# Patient Record
Sex: FEMALE | Race: WHITE | Employment: OTHER | URBAN - METROPOLITAN AREA
[De-identification: names, ages, dates, MRNs, and addresses within clinical notes are randomized per-mention and may not be internally consistent; named-entity substitution may affect disease eponyms.]

---

## 2025-02-28 LAB
NON-UH HIE ALANINE AMINOTRANSFERASE:CCNC:PT:SER/PLAS:QN:NO ADDITION OF P-5': 16 INT._UNIT/L (ref 6–46)
NON-UH HIE ALBUMIN/GLOBULIN:MCRTO:PT:SER:QN:: 1.7 (ref 1.1–2.2)
NON-UH HIE ALBUMIN:MCNC:PT:SER/PLAS:QN:: 4.8 GM/DL (ref 3.3–5)
NON-UH HIE ALKALINE PHOSPHATASE:CCNC:PT:SER/PLAS:QN:: 130 INT._UNIT/L (ref 21–98)
NON-UH HIE ANION GAP:SCNC:PT:SER/PLAS:QN:: 12 MEQ/L (ref 6–16)
NON-UH HIE ASPARTATE AMINOTRANSFERASE:CCNC:PT:SER/PLAS:QN:: 17 INT._UNIT/L (ref 5–43)
NON-UH HIE BACTERIA:PRTHR:PT:URINE:ORD:AUTOMATED: ABNORMAL /HPF
NON-UH HIE BASOPHILS/LEUKOCYTES:NFR.DF:PT:BLD:QN:AUTOMATED COUNT: 0 E9/L (ref 0–0.2)
NON-UH HIE BASOPHILS:NCNC:PT:BLD:QN:AUTOMATED COUNT: 0.4 % (ref 0–2)
NON-UH HIE BILIRUBIN.GLUCURONIDATED+BILIRUBIN.ALBUMIN BOUND:MCNC:PT:SER/PLA: 0 MG/DL (ref 0–0.4)
NON-UH HIE BILIRUBIN.NON-GLUCURONIDATED:MSCNC:PT:SER/PLAS:QN:: 0.3 MG/DL (ref 0.1–0.9)
NON-UH HIE BILIRUBIN:MCNC:PT:SER/PLAS:QN:: 0.3 MG/DL (ref 0–1.1)
NON-UH HIE BILIRUBIN:PRTHR:PT:URINE:ORD:TEST STRIP.AUTOMATED: NEGATIVE MG/DL
NON-UH HIE CALCIUM:MCNC:PT:SER/PLAS:QN:: 9.7 MG/DL (ref 8.9–11.1)
NON-UH HIE CARBON DIOXIDE:SCNC:PT:SER/PLAS:QN:: 26 MMOL/L (ref 21–31)
NON-UH HIE CHLORIDE:SCNC:PT:SER/PLAS:QN:: 105 MMOL/L (ref 101–111)
NON-UH HIE CLARITY:TYPE:PT:URINE:NOM:: ABNORMAL
NON-UH HIE CLASS:TYPE:PT:URINE COLLECTION METHOD:NOM:*: ABNORMAL
NON-UH HIE COLOR:TYPE:PT:URINE:NOM:AUTO: ABNORMAL
NON-UH HIE CREATININE:MCNC:PT:SER/PLAS:QN:: 0.8 MG/DL (ref 0.5–1.3)
NON-UH HIE EGFR: 86 ML/MIN/1.73 M2
NON-UH HIE EOSINOPHILS/100 LEUKOCYTES:NFR:PT:BLD:QN:AUTOMATED COUNT: 0.9 % (ref 0–8)
NON-UH HIE EOSINOPHILS:NCNC:PT:BLD:QN:: 0.1 E9/L (ref 0–0.5)
NON-UH HIE EPITHELIAL CELLS.SQUAMOUS:NARIC:PT:URINE SED:QN:AUTOMATED COUNT: ABNORMAL CD:4673145863
NON-UH HIE ERYTHROCYTE DISTRIBUTION WIDTH:RATIO:PT:RBC:QN:AUTOMATED COUNT: 13.1 % (ref 10.9–14.2)
NON-UH HIE ERYTHROCYTE MEAN CORPUSCULAR HEMOGLOBIN CONCENTRATION:MCNC:PT:RB: 33.6 GM/DL (ref 31.4–36)
NON-UH HIE ERYTHROCYTE MEAN CORPUSCULAR HEMOGLOBIN:ENTMASS:PT:RBC:QN:AUTOMA: 31 PG (ref 27–34)
NON-UH HIE ERYTHROCYTE MEAN CORPUSCULAR VOLUME:ENTVOL:PT:RBC:QN:AUTOMATED C: 92.1 FL (ref 80–100)
NON-UH HIE ERYTHROCYTES:NCNC:PT:BLD:QN:AUTOMATED COUNT: 4.6 E12/L (ref 4.3–5.9)
NON-UH HIE GLOBULIN:MCNC:PT:SER:QN:CALCULATED: 2.9 GM/DL (ref 1.4–4)
NON-UH HIE GLUCOSE:MCNC:PT:SER/PLAS:QN:: 102 MG/DL (ref 55–199)
NON-UH HIE GLUCOSE:PRTHR:PT:URINE:ORD:TEST STRIP: NEGATIVE MG/DL
NON-UH HIE HEMATOCRIT:VFR:PT:BLD:QN:AUTOMATED COUNT: 42.5 % (ref 34–46)
NON-UH HIE HEMOGLOBIN:MCNC:PT:BLD:QN:: 14.3 GM/DL (ref 12–16)
NON-UH HIE HEMOGLOBIN:MCNC:PT:URINE:SEMIQN:TEST STRIP.AUTOMATED: NEGATIVE MG/DL
NON-UH HIE KETONES:PRTHR:PT:URINE:ORD:TEST STRIP.AUTOMATED: ABNORMAL MG/DL
NON-UH HIE LEUKOCYTE ESTERASE:PRTHR:PT:URINE:ORD:TEST STRIP.AUTOMATED: NEGATIVE CD:4673125267
NON-UH HIE LEUKOCYTES: 6.9 E9/L (ref 4–11)
NON-UH HIE LYMPHOCYTES:NCNC:PT:BLD:QN:: 1 E9/L (ref 1–4)
NON-UH HIE LYMPHOCYTES:NCNC:PT:BLD:QN:AUTOMATED COUNT: 14.3 % (ref 14–50)
NON-UH HIE MONOCYTES:NCNC:PT:BLD:QN:AUTOMATED COUNT: 0.7 E9/L (ref 0.2–1)
NON-UH HIE MUCUS:PRTHR:PT:URINE:ORD:AUTOMATED: ABNORMAL CD:4673145661
NON-UH HIE NEUTROPHILS/100 LEUKOCYTES:NFR:PT:BLD:QN:: 74.9 % (ref 36–75)
NON-UH HIE NEUTROPHILS:NCNC:PT:BLD:QN:AUTOMATED COUNT: 5.2 E9/L (ref 2–7.5)
NON-UH HIE NITRITE:PRTHR:PT:URINE:ORD:TEST STRIP.AUTOMATED: NEGATIVE MG/DL
NON-UH HIE PH:LSCNC:PT:URINE:QN:TEST STRIP: 6 (ref 5–9)
NON-UH HIE PLATELET MEAN VOLUME:ENTVOL:PT:BLD:QN:AUTOMATED COUNT: 7.7 FL (ref 6.4–10.8)
NON-UH HIE PLATELETS:NCNC:PT:BLD:QN:AUTOMATED COUNT: 192 E9/L (ref 150–500)
NON-UH HIE POTASSIUM:SCNC:PT:SER/PLAS:QN:: 3.6 MMOL/L (ref 3.5–5.3)
NON-UH HIE PROTEIN:MCNC:PT:SER/PLAS:QN:: 7.7 GM/DL (ref 6–7.8)
NON-UH HIE PROTEIN:PRTHR:PT:URINE:ORD:TEST STRIP: ABNORMAL MG/DL
NON-UH HIE SODIUM:SCNC:PT:SER/PLAS:QN:: 139 MMOL/L (ref 135–145)
NON-UH HIE SPECIFIC GRAVITY:RDEN:PT:URINE:QN:TEST STRIP: 1.03 (ref 1–1.03)
NON-UH HIE TRIACYLGLYCEROL LIPASE:CCNC:PT:SER/PLAS:QN:: 40 UNIT/L (ref 13–58)
NON-UH HIE TUBE COLLECTED PLASMA: YES
NON-UH HIE UREA NITROGEN/CREATININE:MRTO:PT:SER/PLAS:QN:: 16 NO UNITS (ref 10–20)
NON-UH HIE UREA NITROGEN:MCNC:PT:SER/PLAS:QN:: 13 MG/DL (ref 5–21)
NON-UH HIE UROBILINOGEN:MCNC:PT:URINE:SEMIQN:TEST STRIP: ABNORMAL MG/DL

## 2025-03-15 NOTE — H&P
psh:         left ankle 2022         left shoulder 2024         r shoulder 2024         gastric bypass 2002         gastric bypass 2010         gallbladder 2002         common bile duct         gastric fistula 2024         hysterectomy 2012           sh: negative tobacco         occasional etoh         substance use negative           pmh:         syncope         migraine         low bp         bipolar         anxiety         ulcers stomach           allergies:         Nkda           meds:         gabapentin 600mg 2 tab am, 1.5 tabs lunch, 2/5tabs hs         topamax 100mg hs         trazodone 400mg hs         lorazepam 1mg tid         vit e 180mg qd         vit k am         muv         olanzapine 5mg hs         zolpidem 10mg hs         nurtec 75mg prn migraine         magnesium 500mg am         ropinirole 0.mg tid         midodrine 5mg tid         cymbalta 60mg am         nexium 40mg bid         buspar 15mg bid         fanapt 12mg qd           ros: neg other than above           Risks, benefits and alternatives to surgery were discussed:         Risks including bleeding, infection, temporary or permanent numbness, tingling, weakness, paralysis, bowel or bladder dysfunction, leaking of cerebrospinal fluid leading to meningitis, no pain improvement or worsening of pain, future staged surgery, seizure, stroke, blindness, heart attack, blood clot in legs, pulmonary embolism, coma, death, or other. Risk of recurrent or residual symptoms and scar tissue formation which cannot be improved by further surgery.         [Spinal hardware and instrumentation risks include: hardware failure or breaking of screws or rods or plates requiring revision or removal.         Nonfusion or pseudoarthrosis risk is higher with diabetes or tobacco use and may cause long term pain and require future revision, removal or other surgery.]         [Staged surgery a possibility including spinal cord stimulation for continuing pain in the back or

## 2025-03-19 ENCOUNTER — ANESTHESIA EVENT (OUTPATIENT)
Dept: OPERATING ROOM | Age: 55
End: 2025-03-19
Payer: MEDICARE

## 2025-03-20 ENCOUNTER — HOSPITAL ENCOUNTER (INPATIENT)
Age: 55
LOS: 2 days | Discharge: HOME | DRG: 402 | End: 2025-03-22
Attending: NEUROLOGICAL SURGERY | Admitting: NEUROLOGICAL SURGERY
Payer: MEDICARE

## 2025-03-20 ENCOUNTER — APPOINTMENT (OUTPATIENT)
Dept: GENERAL RADIOLOGY | Age: 55
DRG: 402 | End: 2025-03-20
Attending: NEUROLOGICAL SURGERY
Payer: MEDICARE

## 2025-03-20 ENCOUNTER — ANESTHESIA (OUTPATIENT)
Dept: OPERATING ROOM | Age: 55
End: 2025-03-20
Payer: MEDICARE

## 2025-03-20 DIAGNOSIS — M48.061 SPINAL STENOSIS OF LUMBAR REGION, UNSPECIFIED WHETHER NEUROGENIC CLAUDICATION PRESENT: ICD-10-CM

## 2025-03-20 DIAGNOSIS — R52 PAIN: ICD-10-CM

## 2025-03-20 DIAGNOSIS — M43.16 SPONDYLOLISTHESIS AT L3-L4 LEVEL: Primary | ICD-10-CM

## 2025-03-20 LAB
ABO/RH: NORMAL
ANTIBODY SCREEN: NORMAL
BASE EXCESS ARTERIAL: -5 (ref -3–3)
CALCIUM IONIZED: 1.25 MMOL/L (ref 1.12–1.32)
GLUCOSE BLD-MCNC: 164 MG/DL (ref 70–99)
HCO3 ARTERIAL: 22.2 MMOL/L (ref 21–29)
HCT VFR BLD AUTO: 33 % (ref 36–48)
HGB BLD CALC-MCNC: 11.3 GM/DL (ref 12–16)
LACTATE: 0.65 MMOL/L (ref 0.4–2)
O2 SAT, ARTERIAL: 97 % (ref 93–100)
PCO2 ARTERIAL: 50 MM HG (ref 35–45)
PERFORMED ON: ABNORMAL
PH ARTERIAL: 7.25 (ref 7.35–7.45)
PO2 ARTERIAL: 108 MM HG (ref 75–108)
POC CHLORIDE: 114 MEQ/L (ref 99–110)
POC CREATININE: 0.6 MG/DL (ref 0.6–1.2)
POC FIO2: 2
POC SAMPLE TYPE: ABNORMAL
POTASSIUM SERPL-SCNC: 3.9 MEQ/L (ref 3.5–5.1)
SODIUM BLD-SCNC: 146 MEQ/L (ref 136–145)
TCO2 ARTERIAL: 24 MMOL/L (ref 21–32)

## 2025-03-20 PROCEDURE — 82330 ASSAY OF CALCIUM: CPT

## 2025-03-20 PROCEDURE — 2500000003 HC RX 250 WO HCPCS

## 2025-03-20 PROCEDURE — 2580000003 HC RX 258

## 2025-03-20 PROCEDURE — 7100000000 HC PACU RECOVERY - FIRST 15 MIN: Performed by: NEUROLOGICAL SURGERY

## 2025-03-20 PROCEDURE — 6360000002 HC RX W HCPCS: Performed by: NEUROLOGICAL SURGERY

## 2025-03-20 PROCEDURE — 2580000003 HC RX 258: Performed by: NEUROLOGICAL SURGERY

## 2025-03-20 PROCEDURE — 3600000014 HC SURGERY LEVEL 4 ADDTL 15MIN: Performed by: NEUROLOGICAL SURGERY

## 2025-03-20 PROCEDURE — 6360000002 HC RX W HCPCS: Performed by: STUDENT IN AN ORGANIZED HEALTH CARE EDUCATION/TRAINING PROGRAM

## 2025-03-20 PROCEDURE — 0SG0071 FUSION OF LUMBAR VERTEBRAL JOINT WITH AUTOLOGOUS TISSUE SUBSTITUTE, POSTERIOR APPROACH, POSTERIOR COLUMN, OPEN APPROACH: ICD-10-PCS | Performed by: NEUROLOGICAL SURGERY

## 2025-03-20 PROCEDURE — 84295 ASSAY OF SERUM SODIUM: CPT

## 2025-03-20 PROCEDURE — 00NY0ZZ RELEASE LUMBAR SPINAL CORD, OPEN APPROACH: ICD-10-PCS | Performed by: NEUROLOGICAL SURGERY

## 2025-03-20 PROCEDURE — 84132 ASSAY OF SERUM POTASSIUM: CPT

## 2025-03-20 PROCEDURE — 6360000002 HC RX W HCPCS

## 2025-03-20 PROCEDURE — 86901 BLOOD TYPING SEROLOGIC RH(D): CPT

## 2025-03-20 PROCEDURE — 2500000003 HC RX 250 WO HCPCS: Performed by: NEUROLOGICAL SURGERY

## 2025-03-20 PROCEDURE — 88311 DECALCIFY TISSUE: CPT

## 2025-03-20 PROCEDURE — 86900 BLOOD TYPING SEROLOGIC ABO: CPT

## 2025-03-20 PROCEDURE — 3700000000 HC ANESTHESIA ATTENDED CARE: Performed by: NEUROLOGICAL SURGERY

## 2025-03-20 PROCEDURE — 82565 ASSAY OF CREATININE: CPT

## 2025-03-20 PROCEDURE — 2700000000 HC OXYGEN THERAPY PER DAY

## 2025-03-20 PROCEDURE — C1713 ANCHOR/SCREW BN/BN,TIS/BN: HCPCS | Performed by: NEUROLOGICAL SURGERY

## 2025-03-20 PROCEDURE — 3600000004 HC SURGERY LEVEL 4 BASE: Performed by: NEUROLOGICAL SURGERY

## 2025-03-20 PROCEDURE — 5A09357 ASSISTANCE WITH RESPIRATORY VENTILATION, LESS THAN 24 CONSECUTIVE HOURS, CONTINUOUS POSITIVE AIRWAY PRESSURE: ICD-10-PCS | Performed by: NEUROLOGICAL SURGERY

## 2025-03-20 PROCEDURE — 94660 CPAP INITIATION&MGMT: CPT

## 2025-03-20 PROCEDURE — C1821 INTERSPINOUS IMPLANT: HCPCS | Performed by: NEUROLOGICAL SURGERY

## 2025-03-20 PROCEDURE — 85014 HEMATOCRIT: CPT

## 2025-03-20 PROCEDURE — 1210000000 HC MED SURG R&B

## 2025-03-20 PROCEDURE — 88304 TISSUE EXAM BY PATHOLOGIST: CPT

## 2025-03-20 PROCEDURE — 82803 BLOOD GASES ANY COMBINATION: CPT

## 2025-03-20 PROCEDURE — 0SG00AJ FUSION OF LUMBAR VERTEBRAL JOINT WITH INTERBODY FUSION DEVICE, POSTERIOR APPROACH, ANTERIOR COLUMN, OPEN APPROACH: ICD-10-PCS | Performed by: NEUROLOGICAL SURGERY

## 2025-03-20 PROCEDURE — 01NB0ZZ RELEASE LUMBAR NERVE, OPEN APPROACH: ICD-10-PCS | Performed by: NEUROLOGICAL SURGERY

## 2025-03-20 PROCEDURE — 83605 ASSAY OF LACTIC ACID: CPT

## 2025-03-20 PROCEDURE — 7100000001 HC PACU RECOVERY - ADDTL 15 MIN: Performed by: NEUROLOGICAL SURGERY

## 2025-03-20 PROCEDURE — 82435 ASSAY OF BLOOD CHLORIDE: CPT

## 2025-03-20 PROCEDURE — 2720000010 HC SURG SUPPLY STERILE: Performed by: NEUROLOGICAL SURGERY

## 2025-03-20 PROCEDURE — 36600 WITHDRAWAL OF ARTERIAL BLOOD: CPT

## 2025-03-20 PROCEDURE — 3700000001 HC ADD 15 MINUTES (ANESTHESIA): Performed by: NEUROLOGICAL SURGERY

## 2025-03-20 PROCEDURE — 2709999900 HC NON-CHARGEABLE SUPPLY: Performed by: NEUROLOGICAL SURGERY

## 2025-03-20 PROCEDURE — 2580000003 HC RX 258: Performed by: STUDENT IN AN ORGANIZED HEALTH CARE EDUCATION/TRAINING PROGRAM

## 2025-03-20 PROCEDURE — 86850 RBC ANTIBODY SCREEN: CPT

## 2025-03-20 PROCEDURE — 0ST20ZZ RESECTION OF LUMBAR VERTEBRAL DISC, OPEN APPROACH: ICD-10-PCS | Performed by: NEUROLOGICAL SURGERY

## 2025-03-20 DEVICE — PEEK CANNULA B: Type: IMPLANTABLE DEVICE | Site: SPINE LUMBAR | Status: FUNCTIONAL

## 2025-03-20 DEVICE — CREO MIS 5.5MM CURVED ROD, TITANIUM ALLOY, 60MM LENGTH
Type: IMPLANTABLE DEVICE | Site: SPINE LUMBAR | Status: FUNCTIONAL
Brand: CREO

## 2025-03-20 DEVICE — CREO MIS 5.5MM CURVED ROD, TITANIUM ALLOY, 55MM LENGTH
Type: IMPLANTABLE DEVICE | Site: SPINE LUMBAR | Status: FUNCTIONAL
Brand: CREO

## 2025-03-20 DEVICE — CREO MIS MODULAR POLYAXIAL TULIP, 30MM REDUCTION
Type: IMPLANTABLE DEVICE | Site: SPINE LUMBAR | Status: FUNCTIONAL
Brand: CREO

## 2025-03-20 DEVICE — GRAFT HUM TISS L 10ML BONE MTRX CELLULAR OSTEOCEL PRO: Type: IMPLANTABLE DEVICE | Site: SPINE LUMBAR | Status: FUNCTIONAL

## 2025-03-20 DEVICE — CREO MIS LOCKING CAP
Type: IMPLANTABLE DEVICE | Site: SPINE LUMBAR | Status: FUNCTIONAL
Brand: CREO

## 2025-03-20 DEVICE — CALIBER SPACER 10 X 26MM, 9-13MM, 12&DEG; LORDOTIC
Type: IMPLANTABLE DEVICE | Site: SPINE LUMBAR | Status: FUNCTIONAL
Brand: CALIBER

## 2025-03-20 RX ORDER — FENTANYL CITRATE 50 UG/ML
INJECTION, SOLUTION INTRAMUSCULAR; INTRAVENOUS
Status: DISCONTINUED | OUTPATIENT
Start: 2025-03-20 | End: 2025-03-20 | Stop reason: SDUPTHER

## 2025-03-20 RX ORDER — BUSPIRONE HYDROCHLORIDE 15 MG/1
15 TABLET ORAL 2 TIMES DAILY
COMMUNITY

## 2025-03-20 RX ORDER — ONDANSETRON 2 MG/ML
4 INJECTION INTRAMUSCULAR; INTRAVENOUS
Status: DISCONTINUED | OUTPATIENT
Start: 2025-03-20 | End: 2025-03-20 | Stop reason: HOSPADM

## 2025-03-20 RX ORDER — PANTOPRAZOLE SODIUM 40 MG/1
40 TABLET, DELAYED RELEASE ORAL DAILY
Status: DISCONTINUED | OUTPATIENT
Start: 2025-03-21 | End: 2025-03-22 | Stop reason: HOSPADM

## 2025-03-20 RX ORDER — SENNA AND DOCUSATE SODIUM 50; 8.6 MG/1; MG/1
1 TABLET, FILM COATED ORAL 2 TIMES DAILY
Status: DISCONTINUED | OUTPATIENT
Start: 2025-03-20 | End: 2025-03-22 | Stop reason: HOSPADM

## 2025-03-20 RX ORDER — MIDAZOLAM HYDROCHLORIDE 1 MG/ML
INJECTION, SOLUTION INTRAMUSCULAR; INTRAVENOUS
Status: DISCONTINUED | OUTPATIENT
Start: 2025-03-20 | End: 2025-03-20 | Stop reason: SDUPTHER

## 2025-03-20 RX ORDER — ROPINIROLE 0.5 MG/1
0.5 TABLET, FILM COATED ORAL 3 TIMES DAILY
COMMUNITY

## 2025-03-20 RX ORDER — LORAZEPAM 1 MG/1
1 TABLET ORAL 3 TIMES DAILY
COMMUNITY

## 2025-03-20 RX ORDER — OXYCODONE HYDROCHLORIDE 5 MG/1
5 TABLET ORAL
Status: DISCONTINUED | OUTPATIENT
Start: 2025-03-20 | End: 2025-03-20 | Stop reason: HOSPADM

## 2025-03-20 RX ORDER — ACETAMINOPHEN 325 MG/1
650 TABLET ORAL EVERY 6 HOURS
Status: DISCONTINUED | OUTPATIENT
Start: 2025-03-20 | End: 2025-03-22 | Stop reason: HOSPADM

## 2025-03-20 RX ORDER — SODIUM CHLORIDE, SODIUM LACTATE, POTASSIUM CHLORIDE, CALCIUM CHLORIDE 600; 310; 30; 20 MG/100ML; MG/100ML; MG/100ML; MG/100ML
INJECTION, SOLUTION INTRAVENOUS ONCE
Status: COMPLETED | OUTPATIENT
Start: 2025-03-20 | End: 2025-03-20

## 2025-03-20 RX ORDER — SUCRALFATE 1 G/1
1 TABLET ORAL EVERY 8 HOURS SCHEDULED
Status: DISCONTINUED | OUTPATIENT
Start: 2025-03-20 | End: 2025-03-22 | Stop reason: HOSPADM

## 2025-03-20 RX ORDER — GABAPENTIN 600 MG/1
600 TABLET ORAL 3 TIMES DAILY
COMMUNITY

## 2025-03-20 RX ORDER — TRAZODONE HYDROCHLORIDE 100 MG/1
100 TABLET ORAL NIGHTLY
Status: DISCONTINUED | OUTPATIENT
Start: 2025-03-20 | End: 2025-03-22 | Stop reason: HOSPADM

## 2025-03-20 RX ORDER — ONDANSETRON 4 MG/1
4 TABLET, FILM COATED ORAL EVERY 8 HOURS PRN
Status: ON HOLD | COMMUNITY
End: 2025-03-22 | Stop reason: HOSPADM

## 2025-03-20 RX ORDER — PROPOFOL 10 MG/ML
INJECTION, EMULSION INTRAVENOUS
Status: DISCONTINUED | OUTPATIENT
Start: 2025-03-20 | End: 2025-03-20 | Stop reason: SDUPTHER

## 2025-03-20 RX ORDER — SODIUM CHLORIDE 0.9 % (FLUSH) 0.9 %
5-40 SYRINGE (ML) INJECTION EVERY 12 HOURS SCHEDULED
Status: DISCONTINUED | OUTPATIENT
Start: 2025-03-20 | End: 2025-03-20 | Stop reason: HOSPADM

## 2025-03-20 RX ORDER — MIDODRINE HYDROCHLORIDE 5 MG/1
5 TABLET ORAL 3 TIMES DAILY
Status: DISCONTINUED | OUTPATIENT
Start: 2025-03-20 | End: 2025-03-22 | Stop reason: HOSPADM

## 2025-03-20 RX ORDER — SODIUM CHLORIDE AND POTASSIUM CHLORIDE 150; 900 MG/100ML; MG/100ML
INJECTION, SOLUTION INTRAVENOUS CONTINUOUS
Status: DISCONTINUED | OUTPATIENT
Start: 2025-03-20 | End: 2025-03-22 | Stop reason: HOSPADM

## 2025-03-20 RX ORDER — SODIUM CHLORIDE 9 MG/ML
INJECTION, SOLUTION INTRAVENOUS PRN
Status: DISCONTINUED | OUTPATIENT
Start: 2025-03-20 | End: 2025-03-22 | Stop reason: HOSPADM

## 2025-03-20 RX ORDER — HYDROMORPHONE HYDROCHLORIDE 1 MG/ML
2 INJECTION, SOLUTION INTRAMUSCULAR; INTRAVENOUS; SUBCUTANEOUS
Refills: 0 | Status: DISCONTINUED | OUTPATIENT
Start: 2025-03-20 | End: 2025-03-20

## 2025-03-20 RX ORDER — SUCRALFATE ORAL 1 G/10ML
1 SUSPENSION ORAL
COMMUNITY

## 2025-03-20 RX ORDER — ONDANSETRON 2 MG/ML
INJECTION INTRAMUSCULAR; INTRAVENOUS
Status: DISCONTINUED | OUTPATIENT
Start: 2025-03-20 | End: 2025-03-20 | Stop reason: SDUPTHER

## 2025-03-20 RX ORDER — MIDODRINE HYDROCHLORIDE 5 MG/1
5 TABLET ORAL 3 TIMES DAILY
COMMUNITY

## 2025-03-20 RX ORDER — DULOXETIN HYDROCHLORIDE 60 MG/1
60 CAPSULE, DELAYED RELEASE ORAL DAILY
COMMUNITY

## 2025-03-20 RX ORDER — TOPIRAMATE 100 MG/1
100 TABLET, FILM COATED ORAL 2 TIMES DAILY
COMMUNITY

## 2025-03-20 RX ORDER — METOCLOPRAMIDE HYDROCHLORIDE 5 MG/ML
10 INJECTION INTRAMUSCULAR; INTRAVENOUS
Status: DISCONTINUED | OUTPATIENT
Start: 2025-03-20 | End: 2025-03-20 | Stop reason: HOSPADM

## 2025-03-20 RX ORDER — DULOXETIN HYDROCHLORIDE 60 MG/1
60 CAPSULE, DELAYED RELEASE ORAL DAILY
Status: DISCONTINUED | OUTPATIENT
Start: 2025-03-21 | End: 2025-03-22 | Stop reason: HOSPADM

## 2025-03-20 RX ORDER — CYCLOBENZAPRINE HCL 10 MG
10 TABLET ORAL 3 TIMES DAILY PRN
Qty: 60 TABLET | Refills: 0 | Status: CANCELLED | OUTPATIENT
Start: 2025-03-20 | End: 2025-04-09

## 2025-03-20 RX ORDER — DIPHENHYDRAMINE HYDROCHLORIDE 50 MG/ML
12.5 INJECTION, SOLUTION INTRAMUSCULAR; INTRAVENOUS
Status: DISCONTINUED | OUTPATIENT
Start: 2025-03-20 | End: 2025-03-20 | Stop reason: HOSPADM

## 2025-03-20 RX ORDER — POLYETHYLENE GLYCOL 3350 17 G/17G
17 POWDER, FOR SOLUTION ORAL DAILY PRN
Status: DISCONTINUED | OUTPATIENT
Start: 2025-03-20 | End: 2025-03-21

## 2025-03-20 RX ORDER — ROCURONIUM BROMIDE 10 MG/ML
INJECTION, SOLUTION INTRAVENOUS
Status: DISCONTINUED | OUTPATIENT
Start: 2025-03-20 | End: 2025-03-20 | Stop reason: SDUPTHER

## 2025-03-20 RX ORDER — MAGNESIUM HYDROXIDE 1200 MG/15ML
LIQUID ORAL CONTINUOUS PRN
Status: DISCONTINUED | OUTPATIENT
Start: 2025-03-20 | End: 2025-03-20 | Stop reason: HOSPADM

## 2025-03-20 RX ORDER — MEPERIDINE HYDROCHLORIDE 25 MG/ML
12.5 INJECTION INTRAMUSCULAR; INTRAVENOUS; SUBCUTANEOUS
Status: DISCONTINUED | OUTPATIENT
Start: 2025-03-20 | End: 2025-03-20 | Stop reason: HOSPADM

## 2025-03-20 RX ORDER — NALOXONE HYDROCHLORIDE 0.4 MG/ML
INJECTION, SOLUTION INTRAMUSCULAR; INTRAVENOUS; SUBCUTANEOUS PRN
Status: DISCONTINUED | OUTPATIENT
Start: 2025-03-20 | End: 2025-03-21

## 2025-03-20 RX ORDER — DULOXETIN HYDROCHLORIDE 30 MG/1
30 CAPSULE, DELAYED RELEASE ORAL DAILY
COMMUNITY

## 2025-03-20 RX ORDER — HYDROMORPHONE HYDROCHLORIDE 1 MG/ML
1 INJECTION, SOLUTION INTRAMUSCULAR; INTRAVENOUS; SUBCUTANEOUS
Status: DISCONTINUED | OUTPATIENT
Start: 2025-03-20 | End: 2025-03-21

## 2025-03-20 RX ORDER — DEXAMETHASONE SODIUM PHOSPHATE 10 MG/ML
INJECTION, SOLUTION INTRA-ARTICULAR; INTRALESIONAL; INTRAMUSCULAR; INTRAVENOUS; SOFT TISSUE
Status: DISCONTINUED | OUTPATIENT
Start: 2025-03-20 | End: 2025-03-20 | Stop reason: SDUPTHER

## 2025-03-20 RX ORDER — LIDOCAINE HYDROCHLORIDE 10 MG/ML
INJECTION, SOLUTION EPIDURAL; INFILTRATION; INTRACAUDAL; PERINEURAL
Status: DISCONTINUED | OUTPATIENT
Start: 2025-03-20 | End: 2025-03-20 | Stop reason: SDUPTHER

## 2025-03-20 RX ORDER — LORAZEPAM 1 MG/1
1 TABLET ORAL 3 TIMES DAILY
Status: DISCONTINUED | OUTPATIENT
Start: 2025-03-20 | End: 2025-03-22 | Stop reason: HOSPADM

## 2025-03-20 RX ORDER — BISACODYL 10 MG
10 SUPPOSITORY, RECTAL RECTAL DAILY PRN
Status: DISCONTINUED | OUTPATIENT
Start: 2025-03-20 | End: 2025-03-22 | Stop reason: HOSPADM

## 2025-03-20 RX ORDER — BUSPIRONE HYDROCHLORIDE 7.5 MG/1
15 TABLET ORAL 2 TIMES DAILY
Status: DISCONTINUED | OUTPATIENT
Start: 2025-03-20 | End: 2025-03-22 | Stop reason: HOSPADM

## 2025-03-20 RX ORDER — SODIUM CHLORIDE 9 MG/ML
INJECTION, SOLUTION INTRAVENOUS PRN
Status: DISCONTINUED | OUTPATIENT
Start: 2025-03-20 | End: 2025-03-20 | Stop reason: HOSPADM

## 2025-03-20 RX ORDER — SODIUM CHLORIDE 0.9 % (FLUSH) 0.9 %
5-40 SYRINGE (ML) INJECTION EVERY 12 HOURS SCHEDULED
Status: DISCONTINUED | OUTPATIENT
Start: 2025-03-20 | End: 2025-03-22 | Stop reason: HOSPADM

## 2025-03-20 RX ORDER — GABAPENTIN 300 MG/1
600 CAPSULE ORAL 3 TIMES DAILY
Status: DISCONTINUED | OUTPATIENT
Start: 2025-03-20 | End: 2025-03-22 | Stop reason: HOSPADM

## 2025-03-20 RX ORDER — SODIUM CHLORIDE 0.9 % (FLUSH) 0.9 %
5-40 SYRINGE (ML) INJECTION PRN
Status: DISCONTINUED | OUTPATIENT
Start: 2025-03-20 | End: 2025-03-20 | Stop reason: HOSPADM

## 2025-03-20 RX ORDER — ENOXAPARIN SODIUM 100 MG/ML
30 INJECTION SUBCUTANEOUS DAILY
Status: DISCONTINUED | OUTPATIENT
Start: 2025-03-21 | End: 2025-03-22 | Stop reason: HOSPADM

## 2025-03-20 RX ORDER — TRAZODONE HYDROCHLORIDE 100 MG/1
100 TABLET ORAL NIGHTLY
COMMUNITY

## 2025-03-20 RX ORDER — FENTANYL CITRATE 50 UG/ML
50 INJECTION, SOLUTION INTRAMUSCULAR; INTRAVENOUS EVERY 10 MIN PRN
Status: DISCONTINUED | OUTPATIENT
Start: 2025-03-20 | End: 2025-03-20 | Stop reason: HOSPADM

## 2025-03-20 RX ORDER — VANCOMYCIN HYDROCHLORIDE 1 G/20ML
INJECTION, POWDER, LYOPHILIZED, FOR SOLUTION INTRAVENOUS PRN
Status: DISCONTINUED | OUTPATIENT
Start: 2025-03-20 | End: 2025-03-20 | Stop reason: HOSPADM

## 2025-03-20 RX ORDER — CYCLOBENZAPRINE HCL 10 MG
10 TABLET ORAL EVERY 12 HOURS PRN
Status: DISCONTINUED | OUTPATIENT
Start: 2025-03-20 | End: 2025-03-22 | Stop reason: HOSPADM

## 2025-03-20 RX ORDER — SODIUM CHLORIDE 0.9 % (FLUSH) 0.9 %
5-40 SYRINGE (ML) INJECTION PRN
Status: DISCONTINUED | OUTPATIENT
Start: 2025-03-20 | End: 2025-03-22 | Stop reason: HOSPADM

## 2025-03-20 RX ORDER — ONDANSETRON 4 MG/1
4 TABLET, FILM COATED ORAL EVERY 8 HOURS PRN
Status: DISCONTINUED | OUTPATIENT
Start: 2025-03-20 | End: 2025-03-22 | Stop reason: HOSPADM

## 2025-03-20 RX ORDER — ROPINIROLE 0.5 MG/1
0.5 TABLET, FILM COATED ORAL 3 TIMES DAILY
Status: DISCONTINUED | OUTPATIENT
Start: 2025-03-20 | End: 2025-03-22 | Stop reason: HOSPADM

## 2025-03-20 RX ORDER — ESOMEPRAZOLE MAGNESIUM 40 MG/1
40 GRANULE, DELAYED RELEASE ORAL 2 TIMES DAILY
COMMUNITY

## 2025-03-20 RX ORDER — OXYCODONE HYDROCHLORIDE 5 MG/1
10 TABLET ORAL EVERY 4 HOURS PRN
Refills: 0 | Status: DISCONTINUED | OUTPATIENT
Start: 2025-03-20 | End: 2025-03-21

## 2025-03-20 RX ORDER — EPHEDRINE SULFATE/0.9% NACL/PF 25 MG/5 ML
SYRINGE (ML) INTRAVENOUS
Status: DISCONTINUED | OUTPATIENT
Start: 2025-03-20 | End: 2025-03-20 | Stop reason: SDUPTHER

## 2025-03-20 RX ORDER — TOPIRAMATE 100 MG/1
100 TABLET, FILM COATED ORAL 2 TIMES DAILY
Status: DISCONTINUED | OUTPATIENT
Start: 2025-03-20 | End: 2025-03-22 | Stop reason: HOSPADM

## 2025-03-20 RX ORDER — OXYCODONE HYDROCHLORIDE 5 MG/1
5 TABLET ORAL EVERY 4 HOURS PRN
Status: DISCONTINUED | OUTPATIENT
Start: 2025-03-20 | End: 2025-03-22 | Stop reason: HOSPADM

## 2025-03-20 RX ORDER — NALOXONE HYDROCHLORIDE 0.4 MG/ML
INJECTION, SOLUTION INTRAMUSCULAR; INTRAVENOUS; SUBCUTANEOUS PRN
Status: DISCONTINUED | OUTPATIENT
Start: 2025-03-20 | End: 2025-03-20 | Stop reason: HOSPADM

## 2025-03-20 RX ORDER — LIDOCAINE HYDROCHLORIDE 10 MG/ML
1 INJECTION, SOLUTION EPIDURAL; INFILTRATION; INTRACAUDAL; PERINEURAL
Status: DISCONTINUED | OUTPATIENT
Start: 2025-03-20 | End: 2025-03-20 | Stop reason: HOSPADM

## 2025-03-20 RX ORDER — DULOXETIN HYDROCHLORIDE 30 MG/1
30 CAPSULE, DELAYED RELEASE ORAL DAILY
Status: DISCONTINUED | OUTPATIENT
Start: 2025-03-21 | End: 2025-03-22 | Stop reason: HOSPADM

## 2025-03-20 RX ADMIN — SODIUM CHLORIDE, POTASSIUM CHLORIDE, SODIUM LACTATE AND CALCIUM CHLORIDE: 600; 310; 30; 20 INJECTION, SOLUTION INTRAVENOUS at 12:55

## 2025-03-20 RX ADMIN — FENTANYL CITRATE 50 MCG: 50 INJECTION, SOLUTION INTRAMUSCULAR; INTRAVENOUS at 11:03

## 2025-03-20 RX ADMIN — PROPOFOL 75 MCG/KG/MIN: 10 INJECTION, EMULSION INTRAVENOUS at 11:09

## 2025-03-20 RX ADMIN — SUGAMMADEX 50 MG: 100 INJECTION, SOLUTION INTRAVENOUS at 15:18

## 2025-03-20 RX ADMIN — DEXAMETHASONE SODIUM PHOSPHATE 10 MG: 10 INJECTION INTRAMUSCULAR; INTRAVENOUS at 11:15

## 2025-03-20 RX ADMIN — WATER 2000 MG: 1 INJECTION INTRAMUSCULAR; INTRAVENOUS; SUBCUTANEOUS at 22:09

## 2025-03-20 RX ADMIN — CEFAZOLIN 2000 MG: 2 INJECTION, POWDER, FOR SOLUTION INTRAMUSCULAR; INTRAVENOUS at 11:17

## 2025-03-20 RX ADMIN — EPHEDRINE SULFATE 5 MG: 5 INJECTION INTRAVENOUS at 12:03

## 2025-03-20 RX ADMIN — PHENYLEPHRINE HYDROCHLORIDE 20 MCG/MIN: 10 INJECTION INTRAVENOUS at 12:15

## 2025-03-20 RX ADMIN — ONDANSETRON 4 MG: 2 INJECTION, SOLUTION INTRAMUSCULAR; INTRAVENOUS at 15:03

## 2025-03-20 RX ADMIN — SODIUM CHLORIDE, POTASSIUM CHLORIDE, SODIUM LACTATE AND CALCIUM CHLORIDE: 600; 310; 30; 20 INJECTION, SOLUTION INTRAVENOUS at 10:57

## 2025-03-20 RX ADMIN — PHENYLEPHRINE HYDROCHLORIDE 100 MCG: 10 INJECTION INTRAVENOUS at 11:49

## 2025-03-20 RX ADMIN — PROPOFOL 150 MG: 10 INJECTION, EMULSION INTRAVENOUS at 11:03

## 2025-03-20 RX ADMIN — HYDROMORPHONE HYDROCHLORIDE 0.5 MG: 1 INJECTION, SOLUTION INTRAMUSCULAR; INTRAVENOUS; SUBCUTANEOUS at 16:55

## 2025-03-20 RX ADMIN — ROCURONIUM BROMIDE 50 MG: 10 INJECTION, SOLUTION INTRAVENOUS at 13:14

## 2025-03-20 RX ADMIN — MIDAZOLAM HYDROCHLORIDE 2 MG: 1 INJECTION, SOLUTION INTRAMUSCULAR; INTRAVENOUS at 10:57

## 2025-03-20 RX ADMIN — FENTANYL CITRATE 50 MCG: 50 INJECTION, SOLUTION INTRAMUSCULAR; INTRAVENOUS at 15:30

## 2025-03-20 RX ADMIN — SODIUM CHLORIDE, POTASSIUM CHLORIDE, SODIUM LACTATE AND CALCIUM CHLORIDE: 600; 310; 30; 20 INJECTION, SOLUTION INTRAVENOUS at 12:48

## 2025-03-20 RX ADMIN — PHENYLEPHRINE HYDROCHLORIDE 200 MCG: 10 INJECTION INTRAVENOUS at 12:07

## 2025-03-20 RX ADMIN — PHENYLEPHRINE HYDROCHLORIDE 100 MCG: 10 INJECTION INTRAVENOUS at 12:01

## 2025-03-20 RX ADMIN — SODIUM CHLORIDE 1000 ML: 0.9 INJECTION, SOLUTION INTRAVENOUS at 17:44

## 2025-03-20 RX ADMIN — SODIUM CHLORIDE AND POTASSIUM CHLORIDE: 9; 1.49 INJECTION, SOLUTION INTRAVENOUS at 22:16

## 2025-03-20 RX ADMIN — SODIUM CHLORIDE 1000 ML: 0.9 INJECTION, SOLUTION INTRAVENOUS at 15:59

## 2025-03-20 RX ADMIN — EPHEDRINE SULFATE 10 MG: 5 INJECTION INTRAVENOUS at 12:07

## 2025-03-20 RX ADMIN — PHENYLEPHRINE HYDROCHLORIDE 200 MCG: 10 INJECTION INTRAVENOUS at 12:04

## 2025-03-20 RX ADMIN — EPHEDRINE SULFATE 10 MG: 5 INJECTION INTRAVENOUS at 12:10

## 2025-03-20 RX ADMIN — LIDOCAINE HYDROCHLORIDE 50 MG: 10 INJECTION, SOLUTION EPIDURAL; INFILTRATION; INTRACAUDAL; PERINEURAL at 11:03

## 2025-03-20 RX ADMIN — Medication: at 17:25

## 2025-03-20 RX ADMIN — Medication 10 ML: at 19:50

## 2025-03-20 RX ADMIN — ROCURONIUM BROMIDE 50 MG: 10 INJECTION, SOLUTION INTRAVENOUS at 11:03

## 2025-03-20 RX ADMIN — SUGAMMADEX 50 MG: 100 INJECTION, SOLUTION INTRAVENOUS at 15:14

## 2025-03-20 RX ADMIN — SUGAMMADEX 50 MG: 100 INJECTION, SOLUTION INTRAVENOUS at 15:16

## 2025-03-20 RX ADMIN — PHENYLEPHRINE HYDROCHLORIDE 20 MCG/MIN: 10 INJECTION INTRAVENOUS at 13:05

## 2025-03-20 RX ADMIN — PHENYLEPHRINE HYDROCHLORIDE 300 MCG: 10 INJECTION INTRAVENOUS at 12:10

## 2025-03-20 RX ADMIN — SUGAMMADEX 50 MG: 100 INJECTION, SOLUTION INTRAVENOUS at 15:17

## 2025-03-20 ASSESSMENT — PAIN DESCRIPTION - ORIENTATION
ORIENTATION: LOWER
ORIENTATION: RIGHT;LEFT

## 2025-03-20 ASSESSMENT — ENCOUNTER SYMPTOMS
COUGH: 0
VOMITING: 0
SHORTNESS OF BREATH: 0
NAUSEA: 0
ABDOMINAL PAIN: 0

## 2025-03-20 ASSESSMENT — PAIN SCALES - GENERAL
PAINLEVEL_OUTOF10: 7
PAINLEVEL_OUTOF10: 7
PAINLEVEL_OUTOF10: 0
PAINLEVEL_OUTOF10: 5
PAINLEVEL_OUTOF10: 5
PAINLEVEL_OUTOF10: 7

## 2025-03-20 ASSESSMENT — PAIN DESCRIPTION - DESCRIPTORS
DESCRIPTORS: ACHING;CRAMPING;SHARP
DESCRIPTORS: PATIENT UNABLE TO DESCRIBE
DESCRIPTORS: ACHING;DULL

## 2025-03-20 ASSESSMENT — PAIN DESCRIPTION - ONSET
ONSET: ON-GOING
ONSET: PROGRESSIVE
ONSET: ON-GOING

## 2025-03-20 ASSESSMENT — PAIN DESCRIPTION - PAIN TYPE
TYPE: SURGICAL PAIN;CHRONIC PAIN

## 2025-03-20 ASSESSMENT — PAIN DESCRIPTION - LOCATION
LOCATION: BACK
LOCATION: BACK;LEG
LOCATION: BACK
LOCATION: BACK

## 2025-03-20 ASSESSMENT — PAIN - FUNCTIONAL ASSESSMENT: PAIN_FUNCTIONAL_ASSESSMENT: PREVENTS OR INTERFERES WITH ALL ACTIVE AND SOME PASSIVE ACTIVITIES

## 2025-03-20 NOTE — OP NOTE
Operative Note      Patient: Yamilet Hahn  YOB: 1970  MRN: [id number]    Date of Procedure: 3/20/2025    Pre-Op Diagnosis Codes:      * Spinal stenosis of lumbar region, unspecified whether neurogenic claudication present [M48.061]    Post-Op Diagnosis: Same       Procedure(s):  Navigated transforaminal lumbar interbody fusion Lumbar 3-4 pedicle screws, rods, interbody arthrodesis, interbody cage, spinal monitoring, decompression, laminectomy, facetectomy, foraminotomy, microdissection, autograft, allograft, fluoroscopy, preoperative stereotactic planning, work and removal mass lesion synovial cyst.    Surgeon(s):  José Miguel Esparza MD    Assistant:   Physician Assistant: Felicia Smith PA-C    Anesthesia: General    Estimated Blood Loss (mL): less than 50     Complications: None    Specimens:   ID Type Source Tests Collected by Time Destination   A : L3-4 SYNOVIAL CYST Tissue Back SURGICAL PATHOLOGY José Miguel Esparza MD 3/20/2025 0924        Implants:  Implant Name Type Inv. Item Serial No.  Lot No. LRB No. Used Action   CREO AMP 6.5X40MM HA MODULAR CANNULATED SCREW     [id number] N/A 1 Implanted   CREO AMP 7.5X 40MM HA MODULAR CANNULATED SCREW     [id number] N/A 1 Implanted   GRAFT HUM TISS L 10ML BONE MTRX CELLULAR OSTEOCEL PRO - [id number]  GRAFT HUM TISS L 10ML BONE MTRX CELLULAR OSTEOCEL PRO [id number] [id number]  N/A 1 Implanted   CREO AMP 6.5X40MM HA MODULAR CANNULATED SCREW     [id number] N/A 1 Implanted   CREO AMP 7.5 X 40MM MODULAR CANNULATED SCREW     [id number] N/A 1 Implanted         Drains:   Urinary Catheter 03/20/25 Mosqueda-Temperature (Active)       Findings:  Infection Present At Time Of Surgery (PATOS) (choose all levels that have infection present):  No infection present  Other Findings:     Detailed Description of Procedure:   Risks, benefits and alternatives to surgery were discussed:  Risks including bleeding, infection, temporary or permanent numbness, tingling,  and work based on multiple axial sagittal coronal plane images on the CT x-ray and MRI.  Patient prepped and draped in the usual sterile manner as was C arm fluoroscopy with multiple AP lateral fluoroscopy images used throughout the case.  Operative microscope draped in its usual sterile manner with operative microdissection microtechnique required and used throughout the case.  Local anesthesia infiltrated and sharp skin incision was made.  Subperiosteal dissection at the L3-4 spinous processes, laminae, facets and transverse processes with placement of retractor.  Laminectomy complete facetectomy foraminotomy at L3-4 for decompression of thecal sac and nerve roots.  Additionally, mass removed left L3-4 synovial cyst and sent for permanent pathology but further decompression was required for central canal decompression and contralateral decompression in addition to the work of mass removal left side.  Posterolateral arthrodesis and interbody arthrodesis with local autograft and allograft at L3-4 with decortication.  Placement of pedicle screws at bilateral L3 and L4 with Jamshidi needle and K wires.  Screws were connected with rods and caps counter torque use final tightening 2 clicks.  Complete discectomy at L3-4 and placement of interbody cage at L3-4.  All sponge needle instrument counts correct multiple times.  Copious antibiotic irrigation applied and meticulous hemostasis was achieved.  Multilayer closure performed and sterile dressing applied.  Patient tolerated procedure well without any complication.  Patient extubated and transferred to recovery room in stable condition.     This note was created using voice recognition software and was not corrected for typographical or grammatical errors and may have unintended errors    Electronically signed by SIRENA GREEN MD on 3/20/2025 at 3:31 PM

## 2025-03-20 NOTE — DISCHARGE INSTRUCTIONS
ok shower and remove dressing [id number] no lift>5lbs keep incis dry clean, wear brace when out of bed and ambulating

## 2025-03-20 NOTE — ANESTHESIA POSTPROCEDURE EVALUATION
Department of Anesthesiology  Postprocedure Note    Patient: Yamilet Hahn  MRN: [id number]  YOB: 1970  Date of evaluation: 3/20/2025    Procedure Summary       Date: 03/20/25 Room / Location: [address]    Anesthesia Start: 1100 Anesthesia Stop: 1544    Procedure: Navigated transforaminal lumbar interbody fusion Lumbar 3-4 pedicle screws, rods, interbody arthrodesis, interbody cage, spinal monitoring, decompression, laminectomy, facetectomy, foraminotomy, microdissection, autograft, allograft, fluoroscopy, preoperative stereotactic planning, work and removal mass lesion synovial cyst. (Spine Lumbar) Diagnosis:       Spinal stenosis of lumbar region, unspecified whether neurogenic claudication present      (Spinal stenosis of lumbar region, unspecified whether neurogenic claudication present [M48.061])    Surgeons: José Miguel Esparza MD Responsible Provider: Gerardo Hart DO    Anesthesia Type: general ASA Status: 2            Anesthesia Type: No value filed.    Maurice Phase I: Maurice Score: 10    Maurcie Phase II:      Anesthesia Post Evaluation    Patient location during evaluation: bedside  Patient participation: complete - patient participated  Level of consciousness: awake and awake and alert  Airway patency: patent  Nausea & Vomiting: no nausea and no vomiting  Cardiovascular status: blood pressure returned to baseline and hemodynamically stable  Respiratory status: acceptable  Hydration status: euvolemic  Pain management: adequate        No notable events documented.

## 2025-03-20 NOTE — PROGRESS NOTES
Jayme Chappell Formulary: Substitution Information  All Therapeutic Interchanges are equivalent frequency unless otherwise noted  Nonformulary Medication Formulary Interchange   Esomeprazole 20 mg daily or BID Pantoprazole 40 mg daily   Esomeprazole 40 mg daily Pantoprazole 40 mg daily   Esomeprazole 20 mg IV (Nexium) daily or BID Pantoprazole 40 mg IV (Protonix) daily   Esomeprazole 80 mg IV followed by 8 mg/hr (Nexium) Pantoprazole 80 mg IV followed by 8 mg/hr (Protonix)   Esomeprazole 20mg oral solution Lansoprazole 15mg ODT   Esomeprazole 40mg oral solution Lansoprazole 30mg ODT     Dagoberto Leach R.Ph.  3/20/2025  6:56 PM

## 2025-03-20 NOTE — FLOWSHEET NOTE
PCA started. Pt instructed how to press the button and what to hear from the pump. Nobody else can press the green button. Just her. Pt seems very sleepy at this time. She was able to press the button for return demonstration. RR between 9-11/ min.Co2 @ 42. RH

## 2025-03-21 ENCOUNTER — APPOINTMENT (OUTPATIENT)
Dept: CT IMAGING | Age: 55
DRG: 402 | End: 2025-03-21
Attending: NEUROLOGICAL SURGERY
Payer: MEDICARE

## 2025-03-21 PROBLEM — R52 PAIN: Status: ACTIVE | Noted: 2025-03-21

## 2025-03-21 LAB
ANION GAP SERPL CALCULATED.3IONS-SCNC: 12 MEQ/L (ref 9–15)
BASE EXCESS ARTERIAL: -3 (ref -3–3)
BASE EXCESS ARTERIAL: -3 (ref -3–3)
BUN SERPL-MCNC: 14 MG/DL (ref 6–20)
CALCIUM IONIZED: 1.25 MMOL/L (ref 1.12–1.32)
CALCIUM IONIZED: 1.35 MMOL/L (ref 1.12–1.32)
CALCIUM SERPL-MCNC: 8.7 MG/DL (ref 8.5–9.9)
CHLORIDE SERPL-SCNC: 112 MEQ/L (ref 95–107)
CO2 SERPL-SCNC: 18 MEQ/L (ref 20–31)
CREAT SERPL-MCNC: 0.53 MG/DL (ref 0.5–0.9)
ERYTHROCYTE [DISTWIDTH] IN BLOOD BY AUTOMATED COUNT: 12.7 % (ref 11.5–14.5)
GLUCOSE BLD-MCNC: 115 MG/DL (ref 70–99)
GLUCOSE BLD-MCNC: 94 MG/DL (ref 70–99)
GLUCOSE SERPL-MCNC: 89 MG/DL (ref 70–99)
HCO3 ARTERIAL: 20.5 MMOL/L (ref 21–29)
HCO3 ARTERIAL: 24 MMOL/L (ref 21–29)
HCT VFR BLD AUTO: 30 % (ref 36–48)
HCT VFR BLD AUTO: 31 % (ref 36–48)
HCT VFR BLD AUTO: 33.4 % (ref 37–47)
HGB BLD CALC-MCNC: 10.3 GM/DL (ref 12–16)
HGB BLD CALC-MCNC: 10.6 GM/DL (ref 12–16)
HGB BLD-MCNC: 11.3 G/DL (ref 12–16)
LACTATE: 0.52 MMOL/L (ref 0.4–2)
LACTATE: 0.96 MMOL/L (ref 0.4–2)
MAGNESIUM SERPL-MCNC: 1.8 MG/DL (ref 1.7–2.4)
MCH RBC QN AUTO: 31 PG (ref 27–31.3)
MCHC RBC AUTO-ENTMCNC: 33.8 % (ref 33–37)
MCV RBC AUTO: 91.8 FL (ref 79.4–94.8)
O2 SAT, ARTERIAL: 92 % (ref 93–100)
O2 SAT, ARTERIAL: 99 % (ref 93–100)
PCO2 ARTERIAL: 25 MM HG (ref 35–45)
PCO2 ARTERIAL: 52 MM HG (ref 35–45)
PERFORMED ON: ABNORMAL
PERFORMED ON: ABNORMAL
PH ARTERIAL: 7.27 (ref 7.35–7.45)
PH ARTERIAL: 7.52 (ref 7.35–7.45)
PHOSPHATE SERPL-MCNC: 2.5 MG/DL (ref 2.3–4.8)
PLATELET # BLD AUTO: 156 K/UL (ref 130–400)
PO2 ARTERIAL: 180 MM HG (ref 75–108)
PO2 ARTERIAL: 56 MM HG (ref 75–108)
POC CHLORIDE: 113 MEQ/L (ref 99–110)
POC CHLORIDE: 113 MEQ/L (ref 99–110)
POC CREATININE: 0.6 MG/DL (ref 0.6–1.2)
POC CREATININE: 0.6 MG/DL (ref 0.6–1.2)
POC FIO2: 21
POC FIO2: 40
POC SAMPLE TYPE: ABNORMAL
POC SAMPLE TYPE: ABNORMAL
POTASSIUM SERPL-SCNC: 3.7 MEQ/L (ref 3.5–5.1)
POTASSIUM SERPL-SCNC: 4.1 MEQ/L (ref 3.4–4.9)
POTASSIUM SERPL-SCNC: 4.5 MEQ/L (ref 3.5–5.1)
RBC # BLD AUTO: 3.64 M/UL (ref 4.2–5.4)
SODIUM BLD-SCNC: 146 MEQ/L (ref 136–145)
SODIUM BLD-SCNC: 146 MEQ/L (ref 136–145)
SODIUM SERPL-SCNC: 142 MEQ/L (ref 135–144)
TCO2 ARTERIAL: 21 MMOL/L (ref 21–32)
TCO2 ARTERIAL: 26 MMOL/L (ref 21–32)
TSH REFLEX: 3.91 UIU/ML (ref 0.44–3.86)
WBC # BLD AUTO: 7.1 K/UL (ref 4.8–10.8)

## 2025-03-21 PROCEDURE — 6370000000 HC RX 637 (ALT 250 FOR IP): Performed by: NEUROLOGICAL SURGERY

## 2025-03-21 PROCEDURE — 2500000003 HC RX 250 WO HCPCS: Performed by: NEUROLOGICAL SURGERY

## 2025-03-21 PROCEDURE — 6360000002 HC RX W HCPCS: Performed by: NEUROLOGICAL SURGERY

## 2025-03-21 PROCEDURE — 36415 COLL VENOUS BLD VENIPUNCTURE: CPT

## 2025-03-21 PROCEDURE — 94660 CPAP INITIATION&MGMT: CPT

## 2025-03-21 PROCEDURE — 1210000000 HC MED SURG R&B

## 2025-03-21 PROCEDURE — 83735 ASSAY OF MAGNESIUM: CPT

## 2025-03-21 PROCEDURE — 80048 BASIC METABOLIC PNL TOTAL CA: CPT

## 2025-03-21 PROCEDURE — 82803 BLOOD GASES ANY COMBINATION: CPT

## 2025-03-21 PROCEDURE — 82435 ASSAY OF BLOOD CHLORIDE: CPT

## 2025-03-21 PROCEDURE — 85027 COMPLETE CBC AUTOMATED: CPT

## 2025-03-21 PROCEDURE — 82607 VITAMIN B-12: CPT

## 2025-03-21 PROCEDURE — 84443 ASSAY THYROID STIM HORMONE: CPT

## 2025-03-21 PROCEDURE — 83605 ASSAY OF LACTIC ACID: CPT

## 2025-03-21 PROCEDURE — 82330 ASSAY OF CALCIUM: CPT

## 2025-03-21 PROCEDURE — 84295 ASSAY OF SERUM SODIUM: CPT

## 2025-03-21 PROCEDURE — 97162 PT EVAL MOD COMPLEX 30 MIN: CPT

## 2025-03-21 PROCEDURE — 84132 ASSAY OF SERUM POTASSIUM: CPT

## 2025-03-21 PROCEDURE — 99223 1ST HOSP IP/OBS HIGH 75: CPT | Performed by: PSYCHIATRY & NEUROLOGY

## 2025-03-21 PROCEDURE — 36600 WITHDRAWAL OF ARTERIAL BLOOD: CPT

## 2025-03-21 PROCEDURE — 70450 CT HEAD/BRAIN W/O DYE: CPT

## 2025-03-21 PROCEDURE — 85014 HEMATOCRIT: CPT

## 2025-03-21 PROCEDURE — 82565 ASSAY OF CREATININE: CPT

## 2025-03-21 PROCEDURE — 6370000000 HC RX 637 (ALT 250 FOR IP): Performed by: INTERNAL MEDICINE

## 2025-03-21 PROCEDURE — 97166 OT EVAL MOD COMPLEX 45 MIN: CPT

## 2025-03-21 PROCEDURE — 2700000000 HC OXYGEN THERAPY PER DAY

## 2025-03-21 PROCEDURE — APPSS45 APP SPLIT SHARED TIME 31-45 MINUTES: Performed by: NURSE PRACTITIONER

## 2025-03-21 PROCEDURE — 82746 ASSAY OF FOLIC ACID SERUM: CPT

## 2025-03-21 PROCEDURE — 72131 CT LUMBAR SPINE W/O DYE: CPT

## 2025-03-21 PROCEDURE — 84100 ASSAY OF PHOSPHORUS: CPT

## 2025-03-21 RX ADMIN — OXYCODONE 10 MG: 5 TABLET ORAL at 03:38

## 2025-03-21 RX ADMIN — OXYCODONE 5 MG: 5 TABLET ORAL at 13:27

## 2025-03-21 RX ADMIN — OXYCODONE 5 MG: 5 TABLET ORAL at 20:27

## 2025-03-21 RX ADMIN — GABAPENTIN 600 MG: 300 CAPSULE ORAL at 23:06

## 2025-03-21 RX ADMIN — SENNOSIDES AND DOCUSATE SODIUM 1 TABLET: 50; 8.6 TABLET ORAL at 09:01

## 2025-03-21 RX ADMIN — DULOXETINE 30 MG: 30 CAPSULE, DELAYED RELEASE ORAL at 10:23

## 2025-03-21 RX ADMIN — SENNOSIDES AND DOCUSATE SODIUM 1 TABLET: 50; 8.6 TABLET ORAL at 20:27

## 2025-03-21 RX ADMIN — SUCRALFATE 1 G: 1 TABLET ORAL at 13:06

## 2025-03-21 RX ADMIN — CYCLOBENZAPRINE 10 MG: 10 TABLET, FILM COATED ORAL at 14:51

## 2025-03-21 RX ADMIN — WATER 2000 MG: 1 INJECTION INTRAMUSCULAR; INTRAVENOUS; SUBCUTANEOUS at 08:35

## 2025-03-21 RX ADMIN — HYDROMORPHONE HYDROCHLORIDE 0.5 MG: 1 INJECTION, SOLUTION INTRAMUSCULAR; INTRAVENOUS; SUBCUTANEOUS at 16:51

## 2025-03-21 RX ADMIN — DULOXETINE HYDROCHLORIDE 60 MG: 60 CAPSULE, DELAYED RELEASE ORAL at 10:23

## 2025-03-21 RX ADMIN — ENOXAPARIN SODIUM 30 MG: 100 INJECTION SUBCUTANEOUS at 09:00

## 2025-03-21 RX ADMIN — Medication 10 ML: at 13:22

## 2025-03-21 RX ADMIN — ACETAMINOPHEN 650 MG: 325 TABLET ORAL at 20:27

## 2025-03-21 RX ADMIN — Medication 10 ML: at 20:32

## 2025-03-21 RX ADMIN — SUCRALFATE 1 G: 1 TABLET ORAL at 10:24

## 2025-03-21 RX ADMIN — SUCRALFATE 1 G: 1 TABLET ORAL at 23:06

## 2025-03-21 RX ADMIN — ACETAMINOPHEN 650 MG: 325 TABLET ORAL at 14:50

## 2025-03-21 RX ADMIN — MIDODRINE HYDROCHLORIDE 5 MG: 5 TABLET ORAL at 20:27

## 2025-03-21 RX ADMIN — SODIUM CHLORIDE AND POTASSIUM CHLORIDE: 9; 1.49 INJECTION, SOLUTION INTRAVENOUS at 23:21

## 2025-03-21 RX ADMIN — PANTOPRAZOLE SODIUM 40 MG: 40 TABLET, DELAYED RELEASE ORAL at 09:00

## 2025-03-21 RX ADMIN — ACETAMINOPHEN 650 MG: 325 TABLET ORAL at 09:00

## 2025-03-21 ASSESSMENT — PAIN DESCRIPTION - ORIENTATION
ORIENTATION: RIGHT
ORIENTATION: RIGHT;LEFT
ORIENTATION: RIGHT
ORIENTATION: MID
ORIENTATION: MID

## 2025-03-21 ASSESSMENT — ENCOUNTER SYMPTOMS
CHEST TIGHTNESS: 0
WHEEZING: 0
VOMITING: 0
COUGH: 0
COLOR CHANGE: 0
NAUSEA: 0
SHORTNESS OF BREATH: 0
TROUBLE SWALLOWING: 0

## 2025-03-21 ASSESSMENT — PAIN DESCRIPTION - LOCATION
LOCATION: BACK
LOCATION: BACK
LOCATION: LEG;BACK
LOCATION: LEG
LOCATION: BACK
LOCATION: BACK;LEG

## 2025-03-21 ASSESSMENT — PAIN DESCRIPTION - DESCRIPTORS
DESCRIPTORS: ACHING;SHOOTING
DESCRIPTORS: ACHING;SPASM
DESCRIPTORS: THROBBING
DESCRIPTORS: ACHING
DESCRIPTORS: ACHING;SPASM;SORE
DESCRIPTORS: ACHING

## 2025-03-21 ASSESSMENT — PAIN SCALES - GENERAL
PAINLEVEL_OUTOF10: 10
PAINLEVEL_OUTOF10: 3
PAINLEVEL_OUTOF10: 10
PAINLEVEL_OUTOF10: 7

## 2025-03-21 NOTE — PLAN OF CARE
See OT evaluation for all goals and OT POC. Electronically signed by Esperanza Sellers OTR/L on 3/21/2025 at 11:57 AM

## 2025-03-21 NOTE — CARE COORDINATION
Case Management Assessment  Initial Evaluation    Date/Time of Evaluation: 3/21/2025 11:20 AM  Assessment Completed by: Eliana Marinelli RN    If patient is discharged prior to next notation, then this note serves as note for discharge by case management.    Patient Name: Yamilet Hahn                   YOB: 1970  Diagnosis: Spinal stenosis of lumbar region, unspecified whether neurogenic claudication present [M48.061]  Spondylolisthesis at L3-L4 level [M43.16]                   Date / Time: 3/20/2025  9:11 AM    Patient Admission Status: Inpatient   Readmission Risk (Low < 19, Mod (19-27), High > 27): Readmission Risk Score: 7    Current PCP: Lee Kong, DO  PCP verified by CM? Yes    Chart Reviewed: Yes      History Provided by: Patient  Patient Orientation: Alert and Oriented, Person, Place, Situation, Self    Patient Cognition: Alert    Hospitalization in the last 30 days (Readmission):  No    If yes, Readmission Assessment in  Navigator will be completed.    Advance Directives:      Code Status: Full Code   Patient's Primary Decision Maker is: Legal Next of Kin      Discharge Planning:    Patient lives with:   Type of Home:    Primary Care Giver: Self  Patient Support Systems include: Spouse/Significant Other, Family Members   Current Financial resources:    Current community resources:    Current services prior to admission:              Current DME:              Type of Home Care services:       ADLS  Prior functional level: Independent in ADLs/IADLs  Current functional level: Independent in ADLs/IADLs    PT AM-PAC:   /24  OT AM-PAC:   /24    Family can provide assistance at DC: Yes  Would you like Case Management to discuss the discharge plan with any other family members/significant others, and if so, who? Yes (SPOUSE AS NEEDED)  Plans to Return to Present Housing: Yes  Other Identified Issues/Barriers to RETURNING to current housing: NO  Potential Assistance needed at discharge:

## 2025-03-21 NOTE — SIGNIFICANT EVENT
Rapid response note    Patient was becoming more confused.  She was not speaking initially and then suddenly started having garbled speech.  She kept on repeating the same uncomfortable words.  She follows commands and no obvious weakness, facial droop or other neuro deficit noted.  Only medication patient received is oxycodone more than 2 hours ago.  Patient has significant psychiatric history and likely this presentation is due to that.  However, it rule out stroke.  CT scan ordered and in process.  Will consult neurology.    31 minutes of CC time    Electronically signed by Janak Infante MD on 3/21/2025 at 6:07 AM

## 2025-03-21 NOTE — CONSULTS
[facility] Consult Note  Name: Yamilet Hahn  Age: 54 y.o.  Gender: female  CodeStatus: Full Code  Allergies: No Known Allergies    Chief Complaint:No chief complaint on file.    Primary Care Provider: Lee Kong DO  InpatientTreatment Team: Treatment Team:   José Miguel Esparza MD Rhiew, Richard, MD Patel, Dhruv R, MD Dewitt, Pamela J, RN Hussein, Yazid R, DO Diaz, Kimberly, RN Diaz Raices, Adalberto  Admission Date: 3/20/2025      HPI   Consulting provider: Janak Infante for dysarthria and confusion  Pt seen and examined for neurology consult.  Patient is a 54-year-old female with past medical history of bipolar 1 disorder, anxiety, gastric ulcers, hypotensive syncope, migraine, restless leg, gastric bypass who was admitted to [facility] on 3/20/2025 after undergoing lumbar fusion due to spinal stenosis on 3/20/2025.  Postoperatively patient hypoxic requiring supplemental O2 while using Dilaudid PCA.  Was placed on BiPAP.  ABG showed pCO2 of 50.  No oxygen use required at baseline.  This morning patient noted to be increasingly confused.  Noted to have garbled speech.  Had received oxycodone 2 hours prior.  ABGs done with a pH of 7.516, pCO2 of 25 and O2 sat of 92%.  No significant electrolyte abnormalities.  Hemoglobin stable at 11.3.  No leukocytosis.  CT of the head obtained today and negative for acute intracranial findings.  No mention of encephalomalacia or hydrocephalus.  Patient has remained afebrile.  Slightly hypotensive this morning.  Patient does take Ativan 1 mg 3 times daily which has not been resumed.    Patient is currently alert and oriented x 2.  Does not know the month.  Mother at bedside.  Patient's mother reports that she has had ongoing cognitive issues after she underwent gastric bypass approximately 25 years ago.  She reports cognitive impairment complicated by bipolar disorder.  Patient with short-term memory impairment at baseline.  Patient denies headache.   Dysarthria has resolved.  Appears to be at baseline mental status according to her mother.  No focal deficits.    Patient seen and examined events as noted above.  Patient increasing confused this morning.  ABGs were noted CT scan noted  Vitals:    03/21/25 0518   BP: (!) 118/59   Pulse: (!) 105   Resp:    Temp: 98.6 °F (37 °C)   SpO2: 100%   No family history on file.  Social History     Socioeconomic History    Marital status:      Spouse name: Not on file    Number of children: Not on file    Years of education: Not on file    Highest education level: Not on file   Occupational History    Not on file   Tobacco Use    Smoking status: Not on file    Smokeless tobacco: Not on file   Substance and Sexual Activity    Alcohol use: Not on file    Drug use: Not on file    Sexual activity: Not on file   Other Topics Concern    Not on file   Social History Narrative    Not on file     Social Drivers of Health     Financial Resource Strain: Low Risk  (2/14/2024)    Received from [facility]    Overall Financial Resource Strain (CARDIA)     Difficulty of Paying Living Expenses: Not hard at all   Food Insecurity: No Food Insecurity (2/14/2024)    Received from [facility]    Hunger Vital Sign     Worried About Running Out of Food in the Last Year: Never true     Ran Out of Food in the Last Year: Never true   Transportation Needs: No Transportation Needs (2/14/2024)    Received from [facility]    PRAPARE - Transportation     Lack of Transportation (Medical): No     Lack of Transportation (Non-Medical): No   Physical Activity: Not on file   Stress: Not on file   Social Connections: Not on file   Intimate Partner Violence: Not on file   Housing Stability: Low Risk  (2/14/2024)    Received from [facility]    Housing Stability Vital Sign     Unable to Pay for Housing in the Last Year: No     Number of Places Lived in the Last Year: 1     Unstable Housing in the Last Year: No          Review of Systems  [facility] on 3/20/2025 after undergoing lumbar fusion due to spinal stenosis on 3/20/2025.  Postoperatively patient hypoxic requiring supplemental O2 while using Dilaudid PCA.  Was placed on BiPAP.  ABG showed pCO2 of 50.  No oxygen use required at baseline.  This morning patient noted to be increasingly confused.  Noted to have garbled speech.  Had received oxycodone 2 hours prior.  ABGs done with a pH of 7.516, pCO2 of 25 and O2 sat of 92%.  No significant electrolyte abnormalities.  Hemoglobin stable at 11.3.  No leukocytosis.  CT of the head obtained today and negative for acute intracranial findings.  No mention of encephalomalacia or hydrocephalus.  Patient has remained afebrile.  Slightly hypotensive this morning.  TSH 3.910.  Patient does take Ativan 1 mg 3 times daily which has not been resumed.    Patient with acute postoperative encephalopathy in the setting of recent anesthesia, opioids, hypoxia as well as baseline cognitive impairment (post ECT and gastric bypass).  Currently back to baseline.  Exam is nonfocal.  No concern for seizures at this time.  Will continue to monitor with further workup as indicated.  Given history of gastric bypass will check B12 and folate as well.    I have personally performed a face to face diagnostic evaluation on this patient, reviewed all data and investigations, and am the sole provider of all clinical decisions on the neurological status of this patient.  Patient seen and examined events as noted above.  Patient appears to be somewhat disoriented patient resume Ativan is otherwise likely going to benzo failure.  60% time spent on evaluating pt    Floyd Pierce MD, RACHEL  Diplomate, American Board of Psychiatry & Neurology  Board Certified in Vascular Neurology  Board Certified in Neuromuscular Medicine  Certified in Neurorehabilitation         Collaborating physicians: Dr Pierce    Electronically signed by KARLA Mcconnell - CNP on 3/21/2025 at 9:33 AM

## 2025-03-21 NOTE — CONSULTS
Consult Note    Reason for Consult: Hypoxia post operatively and home meds for anxiety, migraines, and RLS    Requesting Physician:  José Miguel Esparza MD    HISTORY OF PRESENT ILLNESS:    Patient requiring supplemental O2 postoperatively while using PCA pump.  Patient not dependent on O2 at baseline patient peers very fatigued but arouses to voice and is able to state that her pain is tolerable and she is not short of breath.  Patient was placed on BiPAP and is tolerating well.  ABGs obtained on 2 L showing a pH of 7.250 and pCO2 of 50.  Patient past medical history includes medication for anxiety, restless leg, and migraines as well as chronic pain.  Medications held at this time while on BiPAP.    Past Medical History:   Diagnosis Date    Anxiety     Bipolar 1 disorder (HCC)     History of stomach ulcers     Hypotensive syncope     Migraine     Syncope        Past Surgical History:   Procedure Laterality Date    ABDOMEN SURGERY      common bile duct revision    CHOLECYSTECTOMY      GASTRIC BYPASS SURGERY      GASTROCUTANEOUS FISTULA CLOSURE      HYSTERECTOMY (CERVIX STATUS UNKNOWN)      RIGHT OOPHORECTOMY         Prior to Admission medications    Medication Sig Start Date End Date Taking? Authorizing Provider   gabapentin (NEURONTIN) 600 MG tablet Take 1 tablet by mouth 3 times daily. 2 tablets in the morning. 1.5 tablets in the afternoon. 2.5 tablets in the evening.   Yes Neville Moon MD   traZODone (DESYREL) 100 MG tablet Take 1 tablet by mouth nightly 4 tablets at bedtime.   Yes Neville Moon MD   topiramate (TOPAMAX) 100 MG tablet Take 1 tablet by mouth 2 times daily 1 tablet at bedtime   Yes Neville Moon MD   rOPINIRole (REQUIP) 0.5 MG tablet Take 1 tablet by mouth 3 times daily   Yes Neville Moon MD   busPIRone (BUSPAR) 15 MG tablet Take 15 mg by mouth 2 times daily   Yes Neville Moon MD   esomeprazole Magnesium (NEXIUM) 40 MG PACK Take 1 packet by mouth 2 times  Surgical Procedures  Result Date: 3/20/2025  EXAMINATION: SPOT FLUOROSCOPIC IMAGES 3/20/2025 10:30 am TECHNIQUE: Fluoroscopy was provided by the radiology department for procedure. Radiologist was not present during examination. FLUOROSCOPY DOSE AND TYPE: Radiation Exposure Index: Kerma mGy, 4.81 10 images 1 portable CT acquisition Fluoroscopy time: 6 minutes COMPARISON: None HISTORY: ORDERING SYSTEM PROVIDED HISTORY: Pain TECHNOLOGIST PROVIDED HISTORY: What reading provider will be dictating this exam?->CRC Intraprocedural imaging. FINDINGS: Fluoroscopic support provided to subspecialty service for lumbosacral spine procedure.  No obvious complication on the images provided. Please see subspecialty report for full details and interpretation of real time imaging.     Intraprocedural fluoroscopic spot images as above.  See separate procedure report for more information.         Assessment/Plan:    Principal Problem:  Spinal stenosis: Status post fusion L3-L4.  Being managed by neurosurgery  Plan: We will follow recommendations of neurosurgery.      Hypoxia and hypercapnia: Status post surgery patient found to be lethargic and hypoxic on room air.  Likely 2/2 sedation and PCA pump Dilaudid.  ABGs obtained showing SpO2 at 97 on 2 L O2 with mild acidosis and hypercapnia.  Patient placed on BiPAP for now and tolerating well.  Hold oral medications for now and held PCA pump.  Will continue to evaluate through the evening    Active problems:  Anxiety/migraines/RLS: Patient on home meds to control.  We will hold oral agents while on BiPAP.  As patient becomes more arousable we will evaluate and resume as appropriate      Electronically signed by KARLA Waldron - CNP on 3/20/25 at 8:31 PM EDT    Janak Infante MD - supervising physician

## 2025-03-21 NOTE — PROGRESS NOTES
[facility] OCCUPATIONAL THERAPY EVALUATION - ACUTE     NAME: Yamilet Hahn  : 1970 (54 y.o.)  MRN: [id number]  CODE STATUS: Full Code  [facility]    Date of Service: 3/21/2025    Patient Diagnosis(es): Spinal stenosis of lumbar region, unspecified whether neurogenic claudication present [M48.061]  Spondylolisthesis at L3-L4 level [M43.16]   Patient Active Problem List    Diagnosis Date Noted    Spondylolisthesis at L3-L4 level 2025      Pain  Spinal stenosis of lumbar region, unspecified whether neurogenic claudication present    Past Medical History:   Diagnosis Date    Anxiety     Bipolar 1 disorder (HCC)     History of stomach ulcers     Hypotensive syncope     Migraine     Syncope      Past Surgical History:   Procedure Laterality Date    ABDOMEN SURGERY      common bile duct revision    CHOLECYSTECTOMY      GASTRIC BYPASS SURGERY      GASTROCUTANEOUS FISTULA CLOSURE      HYSTERECTOMY (CERVIX STATUS UNKNOWN)      LUMBAR FUSION N/A 2025    Navigated transforaminal lumbar interbody fusion Lumbar 3-4 pedicle screws, rods, interbody arthrodesis, interbody cage, spinal monitoring, decompression, laminectomy, facetectomy, foraminotomy, microdissection, autograft, allograft, fluoroscopy, preoperative stereotactic planning, work and removal mass lesion synovial cyst. performed by José Miguel Esparza MD at [facility] [place]    RIGHT OOPHORECTOMY          Restrictions  Restrictions/Precautions: Fall Risk  Activity Level: Up as Tolerated  Required Braces or Orthoses?: Yes   Up as Tolerated    Safety Devices: Safety Devices  Type of Devices: All fall risk precautions in place;Call light within reach;Left in chair;Chair alarm in place;Nurse notified     Patient's date of birth confirmed: Yes    General:  Chart Reviewed: Yes  Patient assessed for rehabilitation services?: Yes    Subjective  Subjective: \"I feel okay I guess\"       Pain at start of treatment: Yes: 10    Pain at end of treatment: Yes:  4/10    Location: Low back  Description: Sore  Nursing notified: Yes  RN: Hue  Intervention: RN provided pain medication Repositioned    Prior Level of Function:  Social/Functional History  Lives With: Spouse  Type of Home: House  Home Layout: One level  Home Access: Stairs to enter with rails  Entrance Stairs - Number of Steps: \"A couple\"  Bathroom Shower/Tub: Tub/Shower unit  Bathroom Equipment: Grab bars in shower, Shower chair, Hand-held shower  Home Equipment: Cane, Walker - Rolling  Has the patient had two or more falls in the past year or any fall with injury in the past year?: No  Prior Level of Assist for ADLs: Independent  Prior Level of Assist for Homemaking: Independent  Prior Level of Assist for Ambulation: Independent household ambulator, with or without device, Independent community ambulator, with or without device  Prior Level of Assist for Transfers: Independent  Active : Yes  Occupation: On disability    OBJECTIVE:     Orientation Status:  Orientation  Orientation Level: Oriented to person;Oriented to situation (Able to state that she is in the hospital but not which one, able to state year only)    Observation:  Observation/Palpation  Posture: Fair  Observation: Pt alert and attentive, slow processing. Flat affect. Pleasant and agreeable to mobility    Cognition Status:  Cognition  Overall Cognitive Status: Exceptions  Arousal/Alertness: Appears intact  Following Commands: Follows one step commands with increased time;Follows multistep commands with repitition  Attention Span: Difficulty attending to directions  Memory: Decreased short term memory;Decreased recall of recent events  Safety Judgement: Decreased awareness of need for assistance;Decreased awareness of need for safety  Problem Solving: Assistance required to identify errors made;Assistance required to generate solutions;Assistance required to implement solutions;Assistance required to correct errors made  Insights: Decreased  care Score   How much help is needed for putting on and taking off regular lower body clothing?: A Little  How much help is needed for bathing (which includes washing, rinsing, drying)?: A Little  How much help is needed for toileting (which includes using toilet, bedpan, or urinal)?: A Little  How much help is needed for putting on and taking off regular upper body clothing?: A Little  How much help is needed for taking care of personal grooming?: A Little  How much help for eating meals?: A Little  AM-[facility] Inpatient Daily Activity Raw Score: 18  AM-PAC Inpatient ADL T-Scale Score : 38.66  ADL Inpatient CMS 0-100% Score: 46.65    Therapy key for assistance levels -   Independent/Mod I = Pt. is able to perform task with no assistance but may require a device   Stand by assistance = Pt. does not perform task at an independent level but does not need physical assistance, requires verbal cues  Minimal, Moderate, Maximal Assistance = Pt. requires physical assistance (25%, 50%, 75% assist from helper) for task but is able to actively participate in task   Dependent = Pt. requires total assistance with task and is not able to actively participate with task completion     Plan:  Occupational Therapy Plan  Times Per Week: 1-4x  Therapy Duration:  (Length of acute stay)  Current Treatment Recommendations: Strengthening, Balance training, Functional mobility training, Endurance training, Pain management, Safety education & training, Patient/Caregiver education & training, Cognitive reorientation, Equipment evaluation, education, & procurement, Self-Care / ADL, Home management training, Cognitive/Perceptual training    Goals:   Patient will:    - Improve functional endurance to tolerate/complete 30 mins of ADL's  - Be Mod I in UB ADLs   - Be Mod I in LB ADLs  - Be Mod I in ADL transfers without LOB  - Be Mod I in toileting tasks  - Improve B UE strength and endurance to 4/5 in order to participate in self-care activities as

## 2025-03-21 NOTE — PROGRESS NOTES
Upon initial assessment pt is noted to be lethargic and having trouble breathing, MD made aware and new orders were placed. During the shift pt began to refuse the BIPAP and was placed on 2 liters n/c pulse ox at 100. Pt c/o back pain 10/10 and was medicated with prn. Pt began to have AMS and MD made aware and assessed at the bedside with NNO. Upon assessment by this nurse change in mental status had worsen and rapid response was activated. New orders for CT brain was placed, results currently pending.

## 2025-03-21 NOTE — PROGRESS NOTES
Physical Therapy Med Surg Initial Assessment  [place] [facility] [place] [facility]  Room: [place] [id number]       NAME: Yamilet Hahn  : 1970 (54 y.o.)  MRN: [id number]  CODE STATUS: Full Code    Date of Service: 3/21/2025    Patient Diagnosis(es): Spinal stenosis of lumbar region, unspecified whether neurogenic claudication present [M48.061]  Spondylolisthesis at L3-L4 level [M43.16]   No chief complaint on file.    Patient Active Problem List    Diagnosis Date Noted    Spondylolisthesis at L3-L4 level 2025        Past Medical History:   Diagnosis Date    Anxiety     Bipolar 1 disorder (HCC)     History of stomach ulcers     Hypotensive syncope     Migraine     Syncope      Past Surgical History:   Procedure Laterality Date    ABDOMEN SURGERY      common bile duct revision    CHOLECYSTECTOMY      GASTRIC BYPASS SURGERY      GASTROCUTANEOUS FISTULA CLOSURE      HYSTERECTOMY (CERVIX STATUS UNKNOWN)      LUMBAR FUSION N/A 2025    Navigated transforaminal lumbar interbody fusion Lumbar 3-4 pedicle screws, rods, interbody arthrodesis, interbody cage, spinal monitoring, decompression, laminectomy, facetectomy, foraminotomy, microdissection, autograft, allograft, fluoroscopy, preoperative stereotactic planning, work and removal mass lesion synovial cyst. performed by José Miguel Esp[place] [facility] [place]    RIGHT OOPHORECTOMY         Chart Reviewed: Yes  Patient assessed for rehabilitation services?: Yes  Family/Caregiver Present: No  Diagnosis: Spondylolisthesis at L3-L4 level s/p lumbar interbody fusion Lumbar 3-4 3/20/25  General  General Comments: Pt resting in bed - agreeable to PT evaluation    Restrictions:  Restrictions/Precautions: Fall Risk  Required Braces or Orthoses  Spinal: Lumbar Corset  Activity Level: Up as Tolerated     SUBJECTIVE:   Subjective: \"I'm doing OK. I need help getting up.\"    Pain  Pain  Pre-Pain: 3  Post-Pain: 2  Pain Location:  (back pain - incisional)  Pain Interventions: Nurse

## 2025-03-21 NOTE — PROGRESS NOTES
Internal Medicine   Hospitalist   Progress Note    3/21/2025   11:16 AM    Name:  Yamilet Hahn  MRN:    [id number]     IP Day: 1     Admit Date: 3/20/2025  9:11 AM  PCP: Lee Kong DO    Code Status:  Full Code    Assessment and Plan:        Active Problems/ diagnosis:     Principal Problem:  Spinal stenosis: Status post fusion L3-L4.  Being managed by neurosurgery  Plan: We will follow recommendations of neurosurgery.      Confusion  -She was evaluated by night team with CT head which was negative.  She did not have any focal deficits.  She feels back to normal this morning.  Neurologist was consulted.  -Resume neurochecks     Hypoxia and hypercapnia: Status post surgery patient found to be lethargic and hypoxic on room air.  Likely 2/2 sedation and PCA pump Dilaudid.  ABGs obtained showing SpO2 at 97 on 2 L O2 with mild acidosis and hypercapnia.  Patient placed on BiPAP for now and tolerating well.  Hold oral medications for now and held PCA pump.  Will continue to evaluate through the evening     Active problems:  Anxiety/migraines/RLS: Patient on home meds to control.  We will hold oral agents while on BiPAP.  As patient becomes more arousable we will evaluate and resume as appropriate       7 pm- 7 am, please contact on call Hospitalist for any needs     Subjective:      no new events.  Rapid response called last night for increased confusion.  CT head negative.  Labs unremarkable.    Physical Examination:      Vitals:  BP (!) 118/59   Pulse (!) 105   Temp 98.6 °F (37 °C) (Axillary)   Resp 14   Ht 1.575 m (5' 2\")   Wt 75.6 kg (166 lb 9.6 oz)   SpO2 100%   BMI 30.47 kg/m²   Temp (24hrs), Av °F (36.7 °C), Min:97.3 °F (36.3 °C), Max:98.6 °F (37 °C)      General appearance: alert, cooperative and no distress  Mental Status: oriented to person, place and time and normal affect  Lungs: clear to auscultation bilaterally, normal effort  Heart: regular rate and rhythm, no murmur  Abdomen: soft, nontender,  if and when appropriate.       Electronically signed by Janet Pugh DO on 3/21/2025 at 11:16 AM

## 2025-03-21 NOTE — PROGRESS NOTES
Physician Progress Note      PATIENT:               SAGRARIO RICHARDS  CSN #:                  [id number]  :                       1970  ADMIT DATE:       3/20/2025 9:11 AM  DISCH DATE:  RESPONDING  PROVIDER #:        Floyd Pierce MD          QUERY TEXT:    Pt admitted with spinal stenosis s/p fusion L3-4 . Pt noted to have acute   postoperative encephalopathy in the setting of recent anesthesia, opioids,   hypoxia per neurology consult on 3/21. If possible, please document in the   progress notes and discharge summary if you are evaluating and / or treating   any of the following:    The medical record reflects the following:  Risk Factors: PCA pump with Dilaudid, hypercapnia  Clinical Indicators: \"Hypoxia and hypercapnia: Status post surgery patient   found to be lethargic and hypoxic on room air.  Likely 2/2 sedation and PCA   pump Dilaudid.  ABGs obtained showing SpO2 at 97 on 2 L O2 with mild acidosis   and hypercapnia.  Patient placed on BiPAP\" (from IM consult by JANET Bowser   on 3/20)  \"Patient with acute postoperative encephalopathy in the setting of recent   anesthesia, opioids, hypoxia as well as baseline cognitive impairment.  Currently back to baseline.\" (from neurology consult on 3/21)  ABGs showed pH 7.254 pCO2 50 pO2 108 HCO3 22.2 on 2L (from labs 3/20). CT head   showed no acute intracranial abnormality  Treatment: PCA pump held, CT head, monitoring    Thank you,  Nina Soto   Clinical Documentation Improvement Specialist  W: [phone]  Options provided:  -- Toxic metabolic encephalopathy secondary to anesthesia, opioids, hypoxia   and hypercapnia  -- Toxic encephalopathy secondary to anesthesia, opioids, hypoxia and   hypercapnia.  -- Other - I will add my own diagnosis  -- Disagree - Not applicable / Not valid  -- Disagree - Clinically unable to determine / Unknown  -- Refer to Clinical Documentation Reviewer    PROVIDER RESPONSE TEXT:    This patient has toxic metabolic

## 2025-03-22 VITALS
BODY MASS INDEX: 30.66 KG/M2 | SYSTOLIC BLOOD PRESSURE: 119 MMHG | OXYGEN SATURATION: 95 % | DIASTOLIC BLOOD PRESSURE: 70 MMHG | RESPIRATION RATE: 18 BRPM | WEIGHT: 166.6 LBS | HEIGHT: 62 IN | HEART RATE: 94 BPM | TEMPERATURE: 98.6 F

## 2025-03-22 LAB
FOLATE: 23.9 NG/ML (ref 4.8–24.2)
VITAMIN B-12: 601 PG/ML (ref 232–1245)

## 2025-03-22 PROCEDURE — 84630 ASSAY OF ZINC: CPT

## 2025-03-22 PROCEDURE — 97535 SELF CARE MNGMENT TRAINING: CPT

## 2025-03-22 PROCEDURE — 36415 COLL VENOUS BLD VENIPUNCTURE: CPT

## 2025-03-22 PROCEDURE — 6370000000 HC RX 637 (ALT 250 FOR IP): Performed by: INTERNAL MEDICINE

## 2025-03-22 PROCEDURE — 99232 SBSQ HOSP IP/OBS MODERATE 35: CPT | Performed by: PSYCHIATRY & NEUROLOGY

## 2025-03-22 PROCEDURE — 2700000000 HC OXYGEN THERAPY PER DAY

## 2025-03-22 PROCEDURE — 6360000002 HC RX W HCPCS: Performed by: NEUROLOGICAL SURGERY

## 2025-03-22 PROCEDURE — 97116 GAIT TRAINING THERAPY: CPT

## 2025-03-22 PROCEDURE — 2500000003 HC RX 250 WO HCPCS: Performed by: NEUROLOGICAL SURGERY

## 2025-03-22 PROCEDURE — 6370000000 HC RX 637 (ALT 250 FOR IP): Performed by: NEUROLOGICAL SURGERY

## 2025-03-22 RX ORDER — OXYCODONE AND ACETAMINOPHEN 5; 325 MG/1; MG/1
1 TABLET ORAL EVERY 8 HOURS PRN
Qty: 21 TABLET | Refills: 0 | Status: SHIPPED | OUTPATIENT
Start: 2025-03-22 | End: 2025-03-29

## 2025-03-22 RX ADMIN — GABAPENTIN 600 MG: 300 CAPSULE ORAL at 14:12

## 2025-03-22 RX ADMIN — ACETAMINOPHEN 650 MG: 325 TABLET ORAL at 06:34

## 2025-03-22 RX ADMIN — MIDODRINE HYDROCHLORIDE 5 MG: 5 TABLET ORAL at 09:40

## 2025-03-22 RX ADMIN — ACETAMINOPHEN 650 MG: 325 TABLET ORAL at 01:22

## 2025-03-22 RX ADMIN — SENNOSIDES AND DOCUSATE SODIUM 1 TABLET: 50; 8.6 TABLET ORAL at 09:41

## 2025-03-22 RX ADMIN — GABAPENTIN 600 MG: 300 CAPSULE ORAL at 09:40

## 2025-03-22 RX ADMIN — MIDODRINE HYDROCHLORIDE 5 MG: 5 TABLET ORAL at 14:12

## 2025-03-22 RX ADMIN — ACETAMINOPHEN 650 MG: 325 TABLET ORAL at 13:53

## 2025-03-22 RX ADMIN — HYDROMORPHONE HYDROCHLORIDE 0.5 MG: 1 INJECTION, SOLUTION INTRAMUSCULAR; INTRAVENOUS; SUBCUTANEOUS at 03:36

## 2025-03-22 RX ADMIN — PANTOPRAZOLE SODIUM 40 MG: 40 TABLET, DELAYED RELEASE ORAL at 06:34

## 2025-03-22 RX ADMIN — ENOXAPARIN SODIUM 30 MG: 100 INJECTION SUBCUTANEOUS at 09:40

## 2025-03-22 RX ADMIN — SUCRALFATE 1 G: 1 TABLET ORAL at 14:12

## 2025-03-22 RX ADMIN — DULOXETINE HYDROCHLORIDE 60 MG: 60 CAPSULE, DELAYED RELEASE ORAL at 09:40

## 2025-03-22 RX ADMIN — SUCRALFATE 1 G: 1 TABLET ORAL at 06:34

## 2025-03-22 RX ADMIN — OXYCODONE 5 MG: 5 TABLET ORAL at 13:53

## 2025-03-22 RX ADMIN — Medication 10 ML: at 09:41

## 2025-03-22 RX ADMIN — HYDROMORPHONE HYDROCHLORIDE 0.5 MG: 1 INJECTION, SOLUTION INTRAMUSCULAR; INTRAVENOUS; SUBCUTANEOUS at 10:24

## 2025-03-22 RX ADMIN — DULOXETINE 30 MG: 30 CAPSULE, DELAYED RELEASE ORAL at 09:40

## 2025-03-22 RX ADMIN — OXYCODONE 5 MG: 5 TABLET ORAL at 06:47

## 2025-03-22 ASSESSMENT — PAIN SCALES - GENERAL
PAINLEVEL_OUTOF10: 8
PAINLEVEL_OUTOF10: 10
PAINLEVEL_OUTOF10: 6
PAINLEVEL_OUTOF10: 9
PAINLEVEL_OUTOF10: 0

## 2025-03-22 ASSESSMENT — PAIN DESCRIPTION - ORIENTATION
ORIENTATION: RIGHT

## 2025-03-22 ASSESSMENT — PAIN DESCRIPTION - DESCRIPTORS
DESCRIPTORS: ACHING

## 2025-03-22 ASSESSMENT — PAIN DESCRIPTION - LOCATION
LOCATION: LEG
LOCATION: BACK;LEG
LOCATION: LEG

## 2025-03-22 NOTE — PROGRESS NOTES
Physical Therapy Med Surg Daily Treatment Note  Facility/Department: [facility] [place] 2W ORTHO TELE  [facility]       NAME: Yamilet Hahn  : 1970 (54 y.o.)  MRN: [id number]  CODE STATUS: Full Code    Date of Service: 3/22/2025    Patient Diagnosis(es): Spinal stenosis of lumbar region, unspecified whether neurogenic claudication present [M48.061]  Spondylolisthesis at L3-L4 level [M43.16]   No chief complaint on file.    Patient Active Problem List    Diagnosis Date Noted    Pain 2025    Spondylolisthesis at L3-L4 level 2025        Past Medical History:   Diagnosis Date    Anxiety     Bipolar 1 disorder (HCC)     History of stomach ulcers     Hypotensive syncope     Migraine     Syncope      Past Surgical History:   Procedure Laterality Date    ABDOMEN SURGERY      common bile duct revision    CHOLECYSTECTOMY      GASTRIC BYPASS SURGERY      GASTROCUTANEOUS FISTULA CLOSURE      HYSTERECTOMY (CERVIX STATUS UNKNOWN)      LUMBAR FUSION N/A 2025    Navigated transforaminal lumbar interbody fusion Lumbar 3-4 pedicle screws, rods, interbody arthrodesis, interbody cage, spinal monitoring, decompression, laminectomy, facetectomy, foraminotomy, microdissection, autograft, allograft, fluoroscopy, preoperative stereotactic planning, work and removal mass lesion synovial cyst. performed by José Miguel Esparza MD at [facility] [place]    RIGHT OOPHORECTOMY         Chart Reviewed: Yes  Family/Caregiver Present: No    Restrictions:  Restrictions/Precautions: Fall Risk  Required Braces or Orthoses  Spinal: Lumbar Corset    SUBJECTIVE:   Subjective: \"This leg is killing me. (R)\"    Pain  Pain  Pre-Pain: 9  Post-Pain: 9  Pain Location: Right (LE)  Pain Interventions: Nurse notified;Repositioning (medication provided at end of session.)    OBJECTIVE:        Bed mobility  Supine to Sit: Supervision  Sit to Supine: Supervision  Bed Mobility Comments: Increased time and effort for mobility, increased processing time cues needed  to complete log roll technique.    Transfers  Sit to Stand: Stand by assistance  Stand to Sit: Stand by assistance  Comment: improved stability, vc's for improved sequencing and hand placement.    Ambulation  Surface: Level tile  Device: Rolling Walker  Assistance: Stand by assistance  Quality of Gait: Pt with shortened and shuffling steps. Slow pacing.  Distance: 20', 130'    Stairs/Curb  Stairs?: Yes  Stairs  # Steps : 6  Stairs Height: 6\" (step practice on box step in room with R WW simulating hand rail and L HHA simulating spouse assistance for entering home.)  Rails: Right ascending (simulated with WW.)  Device: Hand Held Assist  Assistance: Supervision  Comment: good stability and safety awareness, pt states confidence completing upon dc home.                         Activity Tolerance  Activity Tolerance: Patient tolerated treatment well          ASSESSMENT   Assessment: slow pacing, pt limited by RLE pain, medication provided at end of session. pt states confidence homegoing at [facility] and has no further questions for physical therapy at this time.     Discharge Recommendations:  Continue to assess pending progress, Therapy recommended at discharge         Goals  Long Term Goals  Long Term Goal 1: Pt to complete bed mobility with indep  Long Term Goal 2: Pt to complete transfers with indep  Long Term Goal 3: Pt to manage 50-150ft with LRD and indep  Long Term Goal 4: Pt to complete 2steps with HR and indep    PLAN    General Plan: 1 time a day 3-6 times a week  Safety Devices  Type of Devices: All fall risk precautions in place, Bed alarm in place, Call light within reach, Left in bed, Nurse notified     [facility] (6 CLICK) BASIC MOBILITY  AM-PAC Inpatient Mobility Raw Score : 18     Therapy Time   Individual   Time In 1000   Time Out 1023   Minutes 23      Gait: 14  BM/trsf: 9       Hardy Echeverria PTA, 03/22/25 at 11:47 AM         Definitions for assistance levels  Independent = pt does not require any physical

## 2025-03-22 NOTE — PROGRESS NOTES
[facility] Neurology Daily Progress Note  Name: Yamilet Hahn  Age: 54 y.o.  Gender: female  CodeStatus: Full Code  Allergies: No Known Allergies    Chief Complaint:No chief complaint on file.    Primary Care Provider: Lee Kong DO  InpatientTreatment Team: Treatment Team:   José Miguel Esparza MD Rhiew, Richard, MD Patel, Dhruv R, MD Downs, Angela R Ensign, Claire, RN Vance, Kelly, RN Hussein, Yazid R, DO  Admission Date: 3/20/2025      HPI   Consulting provider: Janak Infante for dysarthria and confusion  Pt seen and examined for neurology consult.  Patient is a 54-year-old female with past medical history of bipolar 1 disorder, anxiety, gastric ulcers, hypotensive syncope, migraine, restless leg, gastric bypass who was admitted to [facility] on 3/20/2025 after undergoing lumbar fusion due to spinal stenosis on 3/20/2025.  Postoperatively patient hypoxic requiring supplemental O2 while using Dilaudid PCA.  Was placed on BiPAP.  ABG showed pCO2 of 50.  No oxygen use required at baseline.  This morning patient noted to be increasingly confused.  Noted to have garbled speech.  Had received oxycodone 2 hours prior.  ABGs done with a pH of 7.516, pCO2 of 25 and O2 sat of 92%.  No significant electrolyte abnormalities.  Hemoglobin stable at 11.3.  No leukocytosis.  CT of the head obtained today and negative for acute intracranial findings.  No mention of encephalomalacia or hydrocephalus.  Patient has remained afebrile.  Slightly hypotensive this morning.  Patient does take Ativan 1 mg 3 times daily which has not been resumed.     Patient is currently alert and oriented x 2.  Does not know the month.  Mother at bedside.  Patient's mother reports that she has had ongoing cognitive issues after she underwent gastric bypass approximately 25 years ago.  She reports cognitive impairment complicated by bipolar disorder.  Patient with short-term memory impairment at baseline.  Patient denies headache.   No   Physical Activity: Not on file   Stress: Not on file   Social Connections: Not on file   Intimate Partner Violence: Not on file   Housing Stability: Low Risk  (2/14/2024)     Received from [facility]     Housing Stability Vital Sign      Unable to Pay for Housing in the Last Year: No      Number of Places Lived in the Last Year: 1      Unstable Housing in the Last Year: No               Review of Systems   Constitutional:  Negative for fatigue and fever.   HENT:  Negative for hearing loss and trouble swallowing.    Eyes:  Negative for visual disturbance.   Respiratory:  Negative for cough, chest tightness, shortness of breath and wheezing.    Cardiovascular:  Negative for chest pain, palpitations and leg swelling.   Gastrointestinal:  Negative for nausea and vomiting.   Genitourinary:  Negative for difficulty urinating.   Skin:  Negative for color change and rash.   Neurological:  Positive for weakness (Generalized). Negative for dizziness, tremors, seizures, syncope, facial asymmetry, speech difficulty, light-headedness, numbness and headaches.   Psychiatric/Behavioral:  Positive for confusion. Negative for agitation and hallucinations. The patient is not nervous/anxious.             Physical Exam  Vitals and nursing note reviewed.   Constitutional:       General: She is not in acute distress.     Appearance: She is not diaphoretic.   HENT:      Head: Normocephalic and atraumatic.   Eyes:      General: No visual field deficit.     Extraocular Movements: Extraocular movements intact.      Pupils: Pupils are equal, round, and reactive to light.   Cardiovascular:      Rate and Rhythm: Normal rate and regular rhythm.   Pulmonary:      Effort: Pulmonary effort is normal. No respiratory distress.      Breath sounds: Normal breath sounds.   Skin:     General: Skin is warm and dry.   Neurological:      Mental Status: She is alert. She is disoriented.      Cranial Nerves: No cranial nerve deficit, dysarthria or

## 2025-03-22 NOTE — DISCHARGE SUMMARY
Physician Discharge Summary     Patient ID:  Yamilet Hahn  [id number]  54 y.o.  1970    Admit date: 3/20/2025    Discharge date and time: 3/22/2025 11:56  Admitting Physician: José Miguel Esparza MD     Discharge Physician: dr esparza    Admission Diagnoses: Spinal stenosis of lumbar region, unspecified whether neurogenic claudication present [M48.061]  Spondylolisthesis at L3-L4 level [M43.16]    Discharge Diagnoses: same    Admission Condition: good    Discharged Condition: good    Indication for Admission: intractable back and leg pain spondylolisthesis stenosis lumbar 3-4    Hospital Course: ro po ambulated urinated pain controlled, pulmonary and neurology issues postop appreciate consults    Consults: neurology and hospitalist    Significant Diagnostic Studies: labs: stable and radiology: CT scan: lumbar stable postop hardware and head neg acute findings    Treatments: surgery: see op note    Discharge Exam:  /70   Pulse 94   Temp 98.6 °F (37 °C) (Oral)   Resp 18   Ht 1.575 m (5' 2\")   Wt 75.6 kg (166 lb 9.6 oz)   SpO2 95%   BMI 30.47 kg/m²     General Appearance:    Alert, cooperative, no distress, appears stated age   Head:    Normocephalic, without obvious abnormality, atraumatic   Eyes:    PERRL, conjunctiva/corneas clear, EOM's intact, fundi     benign, both eyes   Ears:    Normal TM's and external ear canals, both ears   Nose:   Nares normal, septum midline, mucosa normal, no drainage    or sinus tenderness   Throat:   Lips, mucosa, and tongue normal; teeth and gums normal   Neck:   Supple, symmetrical, trachea midline, no adenopathy;     thyroid:  no enlargement/tenderness/nodules; no carotid    bruit or JVD   Back:     Symmetric, no curvature, ROM normal, no CVA tenderness   Lungs:     Clear to auscultation bilaterally, respirations unlabored   Chest Wall:    No tenderness or deformity    Heart:    Regular rate and rhythm, S1 and S2 normal, no murmur, rub   or gallop   Breast Exam:    No  by mouth 2 times daily      !! DULoxetine (CYMBALTA) 30 MG extended release capsule Take 1 capsule by mouth daily      !! DULoxetine (CYMBALTA) 60 MG extended release capsule Take 1 capsule by mouth daily      midodrine (PROAMATINE) 5 MG tablet Take 1 tablet by mouth 3 times daily      iloperidone (FANAPT) 6 MG tablet Take 2 mg by mouth 2 times daily      sucralfate (CARAFATE) 1 GM/10ML suspension Take 10 mLs by mouth 3 times daily (with meals)      LORazepam (ATIVAN) 1 MG tablet Take 1 tablet by mouth 3 times daily.       !! - Potential duplicate medications found. Please discuss with provider.        STOP taking these medications       ondansetron (ZOFRAN) 4 MG tablet Comments:   Reason for Stopping:             Activity: activity as tolerated  Diet: regular diet  Wound Care: keep wound clean and dry    Follow-up with dr reza in 3 weeks.    Signed:  Dr reza  3/22/2025  11:56 AM

## 2025-03-22 NOTE — CARE COORDINATION
CM ENTERED THE ROOM TO DISCUSS DC PLANNING.  PT'S AMPAC WAS 18 YESTERDAY, THIS WOULD NOT QUALIFY FOR HH OR REHAB.  PT AND  DENY HOME GOING NEEDS.

## 2025-03-25 LAB — ZINC SERPL-MCNC: 41.1 UG/DL (ref 60–120)

## 2025-04-01 ENCOUNTER — RESULTS FOLLOW-UP (OUTPATIENT)
Dept: NEUROLOGY | Age: 55
End: 2025-04-01

## 2025-04-01 DIAGNOSIS — E60 ZINC DEFICIENCY: Primary | ICD-10-CM

## 2025-04-01 RX ORDER — ZINC SULFATE 50(220)MG
50 CAPSULE ORAL DAILY
COMMUNITY
Start: 2025-04-01

## 2025-04-01 NOTE — TELEPHONE ENCOUNTER
Zinc level came back low at 41.1.  Patient with history of gastric bypass.  Zinc supplementation of 50 mg daily sent to pharmacy.  Call placed to patient.  No answer.  Message left on her voicemail advising her of lab results and prescription sent.  Patient advised to call office with any questions or concerns.

## (undated) DEVICE — KIT JACK TBL PT CARE

## (undated) DEVICE — TOOL MR8-14MH30 MR8 14CM MATCH 3MM: Brand: MIDAS REX MR8

## (undated) DEVICE — SUTURE VICRYL + SZ 30 L18IN ABSRB UD CP2 L26MM 1/2 CIR REV

## (undated) DEVICE — COVER,TABLE,44X90,STERILE: Brand: MEDLINE

## (undated) DEVICE — SUTURE VICRYL + SZ 0 L27IN ABSRB UD L36MM CT-1 1/2 CIR VCPP41D

## (undated) DEVICE — SURGICAL SUCTION CONNECTING TUBE WITH MALE CONNECTOR AND SUCTION CLAMP, 2 FT. LONG (.6 M), 5 MM I.D.: Brand: CONMED

## (undated) DEVICE — BLADE,CARBON-STEEL,15,STRL,DISPOSABLE,TB: Brand: MEDLINE

## (undated) DEVICE — Device

## (undated) DEVICE — GAUZE,SPONGE,4"X4",16PLY,XRAY,STRL,LF: Brand: MEDLINE

## (undated) DEVICE — SURGIFOAM SPNG SZ 100

## (undated) DEVICE — STERILE-Z SURGICAL PATIENT COVERS CLEAR POLYETHYLENE STERILE UNIVERSAL FIT 10 PER CASE: Brand: STERILE-Z

## (undated) DEVICE — NEURO: Brand: MEDLINE INDUSTRIES, INC.

## (undated) DEVICE — SUTURE VICRYL + SZ 2-0 L18IN ABSRB UD CP-2 L26MM 1/2 CIR REV VCP762D

## (undated) DEVICE — GLOVE ORANGE PI 7 1/2   MSG9075

## (undated) DEVICE — 4-PORT MANIFOLD: Brand: NEPTUNE 2

## (undated) DEVICE — LIQUIBAND RAPID ADHESIVE 36/CS 0.8ML: Brand: MEDLINE

## (undated) DEVICE — LABEL MED MINI W/ MARKER

## (undated) DEVICE — NEEDLE NERVE STIM TROCAR PEDCL ACCS NAV PK

## (undated) DEVICE — X-LARGE COTTON GLOVE: Brand: DEROYAL

## (undated) DEVICE — MARKER SURG SKIN GENTIAN VLT REG TIP W/ 6IN RUL DYNJSM01

## (undated) DEVICE — GLOVE ORANGE PI 7   MSG9070

## (undated) DEVICE — SUTURE ETHILON SZ 2-0 L18IN NONABSORBABLE BLK L26MM FS 3/8 664G

## (undated) DEVICE — KIT ARMOR C DRP COLLAPSIBLE AND SELF EXP TOP CVR FOR FLUOROSCOPIC

## (undated) DEVICE — FLOSEAL WITH RECOTHROM - 10ML.: Brand: FLOSEAL HEMOSTATIC MATRIX

## (undated) DEVICE — AGENT HEMOSTATIC SURG ORIGINAL ABS 2X14IN LOOSE KNIT 12/CA

## (undated) DEVICE — SUTURE VICRYL RAPIDE SZ 3-0 L18IN ABSRB UD PS-2 L19MM 3/8 CIR VR497

## (undated) DEVICE — PIN, 9733235, 100MM, STERILE, PERC REF

## (undated) DEVICE — SHEET,DRAPE,53X77,STERILE: Brand: MEDLINE

## (undated) DEVICE — BLADE ES L6IN ELASTOMERIC COAT INSUL DURABLE BEND UPTO

## (undated) DEVICE — SPONGE,NEURO,1"X1",XR,STRL,LF,10/PK: Brand: MEDLINE

## (undated) DEVICE — KIT,ANTI FOG,W/SPONGE & FLUID,SOFT PACK: Brand: MEDLINE

## (undated) DEVICE — NEPTUNE E-SEP 165MM SUCTION SLEEVE: Brand: NEPTUNE E-SEP

## (undated) DEVICE — 1.6MM NITINOL K-WIRE, 500MM, BLUNT TIP
Type: IMPLANTABLE DEVICE | Site: SPINE LUMBAR | Status: NON-FUNCTIONAL
Removed: 2025-03-20

## (undated) DEVICE — COVER MICSCP W46XL120IN 4 BINOC GLS LENS LEICA

## (undated) DEVICE — Z DISCONTINUED USE 2220190 SUTURE VICRYL SZ 3-0 L27IN ABSRB UD L26MM SH 1/2 CIR J416H

## (undated) DEVICE — MARKER SURG PASS SPHR NDI

## (undated) DEVICE — SYRINGE MED 10ML LUERLOCK TIP W/O SFTY DISP

## (undated) DEVICE — 1LYRTR 16FR10ML100%SILTMPS SNP: Brand: MEDLINE INDUSTRIES, INC.